# Patient Record
Sex: FEMALE | Race: WHITE | Employment: UNEMPLOYED | URBAN - METROPOLITAN AREA
[De-identification: names, ages, dates, MRNs, and addresses within clinical notes are randomized per-mention and may not be internally consistent; named-entity substitution may affect disease eponyms.]

---

## 2020-04-15 ENCOUNTER — NURSE TRIAGE (OUTPATIENT)
Dept: INTERNAL MEDICINE CLINIC | Facility: CLINIC | Age: 30
End: 2020-04-15

## 2020-04-15 NOTE — TELEPHONE ENCOUNTER
Action Requested: Summary for Provider     []  Critical Lab, Recommendations Needed  [] Need Additional Advice  []   FYI    []   Need Orders  [] Need Medications Sent to Pharmacy  []  Other     SUMMARY: Pt not seen since 2015; reports wants to re-establish travel out of state in past 30-days or contact with confirmed COVID-19 patient. Verified allergies and pharmacy on file.          Reason for Disposition  • Nursing judgment    Protocols used: NO PROTOCOL AVAILABLE - SICK ADULT-A-[place]

## 2020-04-15 NOTE — TELEPHONE ENCOUNTER
Patient like new patient I do notdo Video  visits on new patients, she needs to be seen in person, if she feels it is urgent can see any available provider this week in the office, I can see her on Monday versus my day to see patients in the office

## 2020-04-20 ENCOUNTER — OFFICE VISIT (OUTPATIENT)
Dept: INTERNAL MEDICINE CLINIC | Facility: CLINIC | Age: 30
End: 2020-04-20
Payer: MEDICAID

## 2020-04-20 ENCOUNTER — LAB ENCOUNTER (OUTPATIENT)
Dept: LAB | Age: 30
End: 2020-04-20
Attending: INTERNAL MEDICINE
Payer: MEDICAID

## 2020-04-20 VITALS
HEART RATE: 90 BPM | BODY MASS INDEX: 22 KG/M2 | DIASTOLIC BLOOD PRESSURE: 57 MMHG | RESPIRATION RATE: 22 BRPM | WEIGHT: 122 LBS | SYSTOLIC BLOOD PRESSURE: 91 MMHG

## 2020-04-20 DIAGNOSIS — R20.0 NUMBNESS AND TINGLING IN RIGHT HAND: ICD-10-CM

## 2020-04-20 DIAGNOSIS — R19.7 DIARRHEA, UNSPECIFIED TYPE: ICD-10-CM

## 2020-04-20 DIAGNOSIS — F33.1 MODERATE EPISODE OF RECURRENT MAJOR DEPRESSIVE DISORDER (HCC): ICD-10-CM

## 2020-04-20 DIAGNOSIS — R20.2 NUMBNESS AND TINGLING IN RIGHT HAND: ICD-10-CM

## 2020-04-20 DIAGNOSIS — R19.7 DIARRHEA, UNSPECIFIED TYPE: Primary | ICD-10-CM

## 2020-04-20 DIAGNOSIS — R51.9 HEADACHE DISORDER: ICD-10-CM

## 2020-04-20 DIAGNOSIS — G25.2 COARSE TREMORS: ICD-10-CM

## 2020-04-20 DIAGNOSIS — M79.89 MASS OF SOFT TISSUE: ICD-10-CM

## 2020-04-20 PROCEDURE — 80053 COMPREHEN METABOLIC PANEL: CPT

## 2020-04-20 PROCEDURE — 96127 BRIEF EMOTIONAL/BEHAV ASSMT: CPT | Performed by: INTERNAL MEDICINE

## 2020-04-20 PROCEDURE — 84443 ASSAY THYROID STIM HORMONE: CPT

## 2020-04-20 PROCEDURE — 99204 OFFICE O/P NEW MOD 45 MIN: CPT | Performed by: INTERNAL MEDICINE

## 2020-04-20 PROCEDURE — 36415 COLL VENOUS BLD VENIPUNCTURE: CPT

## 2020-04-20 PROCEDURE — 85025 COMPLETE CBC W/AUTO DIFF WBC: CPT

## 2020-04-20 PROCEDURE — 86140 C-REACTIVE PROTEIN: CPT

## 2020-04-20 PROCEDURE — 85652 RBC SED RATE AUTOMATED: CPT

## 2020-04-20 RX ORDER — MIRTAZAPINE 15 MG/1
15 TABLET, FILM COATED ORAL NIGHTLY
Qty: 30 TABLET | Refills: 1 | Status: SHIPPED | OUTPATIENT
Start: 2020-04-20 | End: 2020-06-10

## 2020-04-20 RX ORDER — NICOTINE POLACRILEX 4 MG/1
GUM, CHEWING ORAL
Qty: 30 TABLET | Refills: 0 | Status: SHIPPED | OUTPATIENT
Start: 2020-04-20 | End: 2020-07-27

## 2020-04-21 NOTE — PROGRESS NOTES
HPI:    Patient ID: Michaela Benoit is a 34year old female.   Presents for evaluation of the headache, numbness, diarrhea    HPI   Patient reports that in November she hit her head against sink at home, felt lightheaded nauseous, but did not pass out, had and lethargy  Cardiovascular:  Negative for chest pain and irregular heartbeat/palpitations  Respiratory:  Negative for cough, dyspnea and wheezing.   Eyes:  Negative for eye discharge and vision loss  Endocrine:  Negative for polydipsia and polyphagia  Int No cranial nerve deficit. She displays a negative Romberg sign. Gait normal.   Reflex Scores:       Bicep reflexes are 2+ on the right side and 2+ on the left side. Brachioradialis reflexes are 2+ on the right side and 3+ on the left side.        Disha Acuna

## 2020-04-30 ENCOUNTER — HOSPITAL ENCOUNTER (OUTPATIENT)
Dept: MRI IMAGING | Facility: HOSPITAL | Age: 30
Discharge: HOME | End: 2020-04-30
Attending: INTERNAL MEDICINE
Payer: MEDICAID

## 2020-04-30 DIAGNOSIS — R20.2 NUMBNESS AND TINGLING IN RIGHT HAND: ICD-10-CM

## 2020-04-30 DIAGNOSIS — G25.2 COARSE TREMORS: ICD-10-CM

## 2020-04-30 DIAGNOSIS — R51.9 HEADACHE DISORDER: ICD-10-CM

## 2020-04-30 DIAGNOSIS — R20.0 NUMBNESS AND TINGLING IN RIGHT HAND: ICD-10-CM

## 2020-04-30 DIAGNOSIS — M79.89 MASS OF SOFT TISSUE: ICD-10-CM

## 2020-04-30 PROCEDURE — 72195 MRI PELVIS W/O DYE: CPT | Performed by: INTERNAL MEDICINE

## 2020-04-30 PROCEDURE — 70553 MRI BRAIN STEM W/O & W/DYE: CPT | Performed by: INTERNAL MEDICINE

## 2020-04-30 PROCEDURE — 72141 MRI NECK SPINE W/O DYE: CPT | Performed by: INTERNAL MEDICINE

## 2020-04-30 PROCEDURE — A9575 INJ GADOTERATE MEGLUMI 0.1ML: HCPCS | Performed by: INTERNAL MEDICINE

## 2020-05-01 ENCOUNTER — TELEPHONE (OUTPATIENT)
Dept: INTERNAL MEDICINE CLINIC | Facility: CLINIC | Age: 30
End: 2020-05-01

## 2020-05-01 NOTE — TELEPHONE ENCOUNTER
Spoke to patient, reviewed her symptoms, episodes of syncope first time in her life, prior to episode she went for for a walk, did have strange bowel movement, but felt fine, and later on while watching TV and eating she did have a glass of wine she passed

## 2020-05-01 NOTE — TELEPHONE ENCOUNTER
Dr. Milagros Whitman, please see note below:    Patient called the on call last night and is calling back asking if Dr. Gui Matos advised Dr. Milagros Whitman of what happened to her last night.   Per patient she briefly passed out while she was in a chair eating an suddenly cam

## 2020-05-01 NOTE — TELEPHONE ENCOUNTER
On-call–patient called at night stating that she was feeling faint after vomiting a few hours before she had paged at midday  At that time she is feeling okay  Did advise patient to monitor symptoms and if this happens again then to go to ER or if she is h

## 2020-05-01 NOTE — TELEPHONE ENCOUNTER
Paging    Message # [phone] 2020 12:19a [MARICELAD]  To:  From: NORMAN Marcum MD:  Phone#:  ----------------------------------------------------------------------  Mahogany Shah [phone]  89 RE FAINTED AND VOMITING  Paged  at number : PAGE:

## 2020-05-05 ENCOUNTER — TELEMEDICINE (OUTPATIENT)
Dept: NEUROLOGY | Facility: CLINIC | Age: 30
End: 2020-05-05
Payer: MEDICAID

## 2020-05-05 DIAGNOSIS — R29.818 TRANSIENT NEUROLOGICAL SYMPTOMS: ICD-10-CM

## 2020-05-05 DIAGNOSIS — R55 SYNCOPE, UNSPECIFIED SYNCOPE TYPE: Primary | ICD-10-CM

## 2020-05-05 DIAGNOSIS — R53.1 RIGHT SIDED WEAKNESS: ICD-10-CM

## 2020-05-05 PROCEDURE — 99204 OFFICE O/P NEW MOD 45 MIN: CPT | Performed by: OTHER

## 2020-05-05 NOTE — PROGRESS NOTES
I conducted a telehealth visit with Dony Donahue today, 05/05/20, which was completed using two-way, real-time interactive audio and video communication.  This has been done in good shelyl to provide continuity of care in the best interest of the prov to moderate in severity. However she also had a syncopal event in the beginning of 2020 when she was sitting on a couch by herself and she is not sure if there was any convulsive activity with it or not.   She might have had few other episodes of passing o normal glutition  Neck- No masses or adenopathy  Cv: No cyanosis or edema     Neurological:     Mental Status- Alert and oriented x3.   Normal attention span and concentration  Thought process intact  Memory intact- recent and remote  Mood intact  Fund of k migraines, versus orthostatic syncope, versus vasovagal syncope versus syncope related to her marijuana use.   However it still could be concerning for possibility of seizures and therefore EEG will be done but also echocardiogram and cardiac monitor will b

## 2020-05-07 ENCOUNTER — PATIENT MESSAGE (OUTPATIENT)
Dept: NEUROLOGY | Facility: CLINIC | Age: 30
End: 2020-05-07

## 2020-05-07 NOTE — TELEPHONE ENCOUNTER
From: Brain Chan  To: Cassidy Friend MD  Sent: 5/7/2020 2:57 PM CDT  Subject: Visit Corinne Lacy,    I scheduled the tests for this coming week. I have the spinal tap tomorrow.  I do have one question about the spinal ta

## 2020-05-08 ENCOUNTER — HOSPITAL ENCOUNTER (OUTPATIENT)
Dept: GENERAL RADIOLOGY | Facility: HOSPITAL | Age: 30
Discharge: HOME | End: 2020-05-08
Attending: Other
Payer: MEDICAID

## 2020-05-08 VITALS — SYSTOLIC BLOOD PRESSURE: 106 MMHG | DIASTOLIC BLOOD PRESSURE: 61 MMHG | HEART RATE: 64 BPM | RESPIRATION RATE: 16 BRPM

## 2020-05-08 DIAGNOSIS — R29.818 TRANSIENT NEUROLOGICAL SYMPTOMS: ICD-10-CM

## 2020-05-08 DIAGNOSIS — R55 SYNCOPE, UNSPECIFIED SYNCOPE TYPE: ICD-10-CM

## 2020-05-08 PROCEDURE — 82945 GLUCOSE OTHER FLUID: CPT

## 2020-05-08 PROCEDURE — 82784 ASSAY IGA/IGD/IGG/IGM EACH: CPT

## 2020-05-08 PROCEDURE — 62328 DX LMBR SPI PNXR W/FLUOR/CT: CPT | Performed by: OTHER

## 2020-05-08 PROCEDURE — 36415 COLL VENOUS BLD VENIPUNCTURE: CPT

## 2020-05-08 PROCEDURE — 84157 ASSAY OF PROTEIN OTHER: CPT

## 2020-05-08 PROCEDURE — 89050 BODY FLUID CELL COUNT: CPT

## 2020-05-08 PROCEDURE — 84165 PROTEIN E-PHORESIS SERUM: CPT

## 2020-05-08 PROCEDURE — 82040 ASSAY OF SERUM ALBUMIN: CPT

## 2020-05-08 PROCEDURE — 83916 OLIGOCLONAL BANDS: CPT

## 2020-05-08 PROCEDURE — 89051 BODY FLUID CELL COUNT: CPT

## 2020-05-08 PROCEDURE — 82042 OTHER SOURCE ALBUMIN QUAN EA: CPT

## 2020-05-08 RX ORDER — LIDOCAINE HYDROCHLORIDE 10 MG/ML
INJECTION, SOLUTION EPIDURAL; INFILTRATION; INTRACAUDAL; PERINEURAL
Status: DISCONTINUED
Start: 2020-05-08 | End: 2020-05-08 | Stop reason: WASHOUT

## 2020-05-08 NOTE — TELEPHONE ENCOUNTER
I had a long discussion with the patient about the merits of lumbar puncture.   She then decided to proceed with it

## 2020-05-08 NOTE — IMAGING NOTE
PT D/C VIA WC , VSS NO C/O. AVS GIVEN W/LUMBAR PUNCTURE INSTRUCTIONS, READ/REVIEWED, Q&A DONE.  PT COMFORTABLE W/DC

## 2020-05-09 ENCOUNTER — HOSPITAL ENCOUNTER (OUTPATIENT)
Dept: CV DIAGNOSTICS | Facility: HOSPITAL | Age: 30
Discharge: HOME | End: 2020-05-09
Attending: Other
Payer: MEDICAID

## 2020-05-09 DIAGNOSIS — R55 SYNCOPE, UNSPECIFIED SYNCOPE TYPE: ICD-10-CM

## 2020-05-09 PROCEDURE — 93306 TTE W/DOPPLER COMPLETE: CPT | Performed by: OTHER

## 2020-05-11 ENCOUNTER — TELEPHONE (OUTPATIENT)
Dept: NEUROLOGY | Facility: CLINIC | Age: 30
End: 2020-05-11

## 2020-05-11 NOTE — TELEPHONE ENCOUNTER
----- Message from Cassidy Friend MD sent at 5/11/2020 10:25 AM CDT -----  Please let the patient know that results of these particular lab tests so far were normal.  However we are still waiting for the additional test for oligoclonal bands    Thank

## 2020-05-11 NOTE — TELEPHONE ENCOUNTER
----- Message from Macy Stevenson MD sent at 5/11/2020 10:24 AM CDT -----  Please let the patient know that echocardiogram didn't show signs of the a. Fib or any holes that could have contributed to the TIA/stroke symptoms.

## 2020-05-11 NOTE — TELEPHONE ENCOUNTER
Spoke to patient and notified her of below. She was understanding. She had LP done on Friday. She states that she had slight headache Saturday but yesterday she had migraine which has lingered on to today. She states that it is worst than Saturday.  She

## 2020-05-12 ENCOUNTER — TELEPHONE (OUTPATIENT)
Dept: PHYSICAL THERAPY | Facility: HOSPITAL | Age: 30
End: 2020-05-12

## 2020-05-12 ENCOUNTER — APPOINTMENT (OUTPATIENT)
Dept: PHYSICAL THERAPY | Facility: HOSPITAL | Age: 30
End: 2020-05-12
Attending: Other
Payer: MEDICAID

## 2020-05-12 ENCOUNTER — TELEPHONE (OUTPATIENT)
Dept: NEUROLOGY | Facility: CLINIC | Age: 30
End: 2020-05-12

## 2020-05-12 NOTE — TELEPHONE ENCOUNTER
S/w pt who verbalized understanding of normal test results per Dr. Meier Crimes in previous note and knows to repeat MRI in 1 year. Pt states headaches were better today. She has been up and moving around and is tolerating it well.  Pt will try caffeine pills

## 2020-05-12 NOTE — TELEPHONE ENCOUNTER
LMTCB - regard headache tx - continued on 5/12/20 telephone encounter when calling pt w/ additional results.

## 2020-05-12 NOTE — TELEPHONE ENCOUNTER
LMTCB regarding results/headache tx recommendations in 5/11/20 telephone encounter (5 cups of coffee/caffeine pills) per Dr. Rosalie Peterson.

## 2020-05-12 NOTE — TELEPHONE ENCOUNTER
----- Message from Yaz Haque MD sent at 5/12/2020  7:31 AM CDT -----  Please let the patient know that results of these particular lab tests so far were normal.  No oligoclonal bands, which makes the diagnosis of multiple sclerosis less likely.

## 2020-05-14 ENCOUNTER — OFFICE VISIT (OUTPATIENT)
Dept: PHYSICAL THERAPY | Facility: HOSPITAL | Age: 30
End: 2020-05-14
Attending: Other
Payer: MEDICAID

## 2020-05-14 DIAGNOSIS — R53.1 RIGHT SIDED WEAKNESS: ICD-10-CM

## 2020-05-14 PROCEDURE — 97161 PT EVAL LOW COMPLEX 20 MIN: CPT

## 2020-05-14 PROCEDURE — 97112 NEUROMUSCULAR REEDUCATION: CPT

## 2020-05-14 NOTE — PATIENT INSTRUCTIONS
[facility]    1. Stand heel-toe for 30 seconds eyes open, then 30 sec eyes closed, with one foot forward, then switch and do the other foot. 2.  Stand on one leg, 30 sec on each. If you can, try to do eyes closed.     3. Stand with 2 cups or

## 2020-05-14 NOTE — PROGRESS NOTES
NEUROLOGICAL PHYSICAL THERAPY EVALUATION:   Referring Physician: Dr. Dolores Coker  Diagnosis: right sided weakness, imbalance      Date of Onset: January 2020 Date of Service: 5/14/2020  Insurance:  Medicaid     PATIENT SUMMARY   Maria C Fanny is a 34 year impaired coordination right UE. The results of the objective tests and measures show decreased postural control for standing/functional tasks, dysmetria with placing/coordination tasks with right LE.   Functional deficits include but are not limited to imba plan, expectations, and prognosis. Pt was also provided recommendations for postural corrections and importance of remaining active. Patient was instructed in and issued a HEP for: balance and coordination tasks.     PLAN for next session:  [place], 4 square

## 2020-05-18 ENCOUNTER — OFFICE VISIT (OUTPATIENT)
Dept: OCCUPATIONAL MEDICINE | Facility: HOSPITAL | Age: 30
End: 2020-05-18
Attending: Other
Payer: MEDICAID

## 2020-05-18 ENCOUNTER — HOSPITAL ENCOUNTER (OUTPATIENT)
Dept: CV DIAGNOSTICS | Facility: HOSPITAL | Age: 30
Discharge: HOME | End: 2020-05-18
Attending: Other
Payer: MEDICAID

## 2020-05-18 DIAGNOSIS — R53.1 RIGHT SIDED WEAKNESS: ICD-10-CM

## 2020-05-18 DIAGNOSIS — R55 SYNCOPE, UNSPECIFIED SYNCOPE TYPE: ICD-10-CM

## 2020-05-18 PROCEDURE — 93270 REMOTE 30 DAY ECG REV/REPORT: CPT | Performed by: OTHER

## 2020-05-18 PROCEDURE — 93271 ECG/MONITORING AND ANALYSIS: CPT | Performed by: OTHER

## 2020-05-18 PROCEDURE — 97166 OT EVAL MOD COMPLEX 45 MIN: CPT | Performed by: OCCUPATIONAL THERAPIST

## 2020-05-18 PROCEDURE — 93272 ECG/REVIEW INTERPRET ONLY: CPT | Performed by: OTHER

## 2020-05-18 PROCEDURE — 97110 THERAPEUTIC EXERCISES: CPT | Performed by: OCCUPATIONAL THERAPIST

## 2020-05-18 NOTE — PROGRESS NOTES
OCCUPATIONAL THERAPY UPPER EXTREMITY EVALUATION:   Referring Physician: Dr. Melanie Pascual  Date of onset:2010  Diagnosis: right sided weakness Date of Service: 05/18/20       PATIENT SUMMARY:   Aundra Holter is a 33 y/o female who presents to therapy today wi Deepa. Significant findings include: Anxiety, Depression        ASSESSMENT:   Roney Richmond is a pleasant 34 yr old female who came to therapy complaining of increased weakness in right hand as well as  poor hand coordination.  She works as a  and l hand   Rapid Alternating Movements: Performed slower in right hand   9 Hole Peg Test:  Right 52 secs, Left  20 secs      ROM: WNL CHUCK UE except right  IF and MF  PIP are hyperextended.     MANUAL MUSCLE TESTING:    STRENGTH/MMT:   Shoulder Elbow Wrist   Fle Charges: OT Eval x1, TE    Total Timed Treatment: 12 min     Total Treatment Time: 45 min       PLAN OF CARE:   Goals:      Pt will be independent and compliant with comprehensive HEP to maintain progress

## 2020-05-20 ENCOUNTER — TELEPHONE (OUTPATIENT)
Dept: PHYSICAL THERAPY | Facility: HOSPITAL | Age: 30
End: 2020-05-20

## 2020-05-20 ENCOUNTER — APPOINTMENT (OUTPATIENT)
Dept: OCCUPATIONAL MEDICINE | Facility: HOSPITAL | Age: 30
End: 2020-05-20
Attending: Other
Payer: MEDICAID

## 2020-05-20 ENCOUNTER — TELEPHONE (OUTPATIENT)
Dept: OCCUPATIONAL MEDICINE | Facility: HOSPITAL | Age: 30
End: 2020-05-20

## 2020-05-21 ENCOUNTER — APPOINTMENT (OUTPATIENT)
Dept: PHYSICAL THERAPY | Facility: HOSPITAL | Age: 30
End: 2020-05-21
Attending: Other
Payer: MEDICAID

## 2020-05-27 ENCOUNTER — OFFICE VISIT (OUTPATIENT)
Dept: OCCUPATIONAL MEDICINE | Facility: HOSPITAL | Age: 30
End: 2020-05-27
Attending: Other
Payer: MEDICAID

## 2020-05-27 PROCEDURE — 97110 THERAPEUTIC EXERCISES: CPT | Performed by: OCCUPATIONAL THERAPIST

## 2020-05-27 PROCEDURE — 97140 MANUAL THERAPY 1/> REGIONS: CPT | Performed by: OCCUPATIONAL THERAPIST

## 2020-05-27 NOTE — PROGRESS NOTES
Dx: Right side weakness       Authorized # of Visits:  8       Next MD visit: none scheduled  Fall Risk: standard         Precautions: n/a           Medication Changes since last visit?: No  Subjective: Patient denies pain in right digits.     Objective: OT.  Patient will demonstrate increase in right two point pinch to 7 lbs and three point pinch kris 12 lbs for ease in picking up glasses at work.   Patient will demonstrate improved hand dexterity as evident by decrease in right 9 hole peg test time by 10 se

## 2020-06-01 ENCOUNTER — OFFICE VISIT (OUTPATIENT)
Dept: OCCUPATIONAL MEDICINE | Facility: HOSPITAL | Age: 30
End: 2020-06-01
Attending: Other
Payer: MEDICAID

## 2020-06-01 PROCEDURE — 97110 THERAPEUTIC EXERCISES: CPT | Performed by: OCCUPATIONAL THERAPIST

## 2020-06-01 PROCEDURE — 97140 MANUAL THERAPY 1/> REGIONS: CPT | Performed by: OCCUPATIONAL THERAPIST

## 2020-06-01 NOTE — PROGRESS NOTES
Dx: Right side weakness       Authorized # of Visits:  8       Next MD visit: none scheduled  Fall Risk: standard         Precautions: n/a           Medication Changes since last visit?: No  Subjective: \"I found the silver rings on [employer] and I think I wi helpful in preventing hyperextension in IP, fabricate for MF as well. Goals:     Pt will be independent and compliant with comprehensive HEP to maintain progress achieved in OT.   Patient will demonstrate increase in right two point pinch to 7 lbs and th

## 2020-06-03 ENCOUNTER — OFFICE VISIT (OUTPATIENT)
Dept: OCCUPATIONAL MEDICINE | Facility: HOSPITAL | Age: 30
End: 2020-06-03
Attending: Other
Payer: MEDICAID

## 2020-06-03 PROCEDURE — 97140 MANUAL THERAPY 1/> REGIONS: CPT | Performed by: OCCUPATIONAL THERAPIST

## 2020-06-03 PROCEDURE — 97110 THERAPEUTIC EXERCISES: CPT | Performed by: OCCUPATIONAL THERAPIST

## 2020-06-03 NOTE — PROGRESS NOTES
Dx: Right side weakness       Authorized # of Visits:  8       Next MD visit: none scheduled  Fall Risk: standard         Precautions: n/a           Medication Changes since last visit?: No  Subjective:  \"The splint you made for my middle finger is too t HEP instruction                       hand exercised and dexterity worksheet  Putty HEP exercises, two point pinch, extension,                Therapeutic Activity                                       Neuromuscular Re-education

## 2020-06-04 ENCOUNTER — OFFICE VISIT (OUTPATIENT)
Dept: PHYSICAL THERAPY | Facility: HOSPITAL | Age: 30
End: 2020-06-04
Attending: Other
Payer: MEDICAID

## 2020-06-04 PROCEDURE — 97112 NEUROMUSCULAR REEDUCATION: CPT

## 2020-06-04 NOTE — PATIENT INSTRUCTIONS
[facility]    1. Stand heel-toe for 30 seconds eyes open, then 30 sec eyes closed, with one foot forward, then switch and do the other foot. 2.  Stand on one leg, 30 sec on each. Turn your head to the right, center, and left.   Only turn as f

## 2020-06-04 NOTE — PROGRESS NOTES
Date  6/4/20          Visit Number  2/6          [facility] x          Ther EX           Ther Act           Gait Training           CRM            Manual             Dx: R Sided weakness, imbalance         Insurance:  Medicaid    Visit # authorized:  [address] [facility], [address] [facility]

## 2020-06-08 ENCOUNTER — OFFICE VISIT (OUTPATIENT)
Dept: OCCUPATIONAL MEDICINE | Facility: HOSPITAL | Age: 30
End: 2020-06-08
Attending: Other
Payer: MEDICAID

## 2020-06-08 PROCEDURE — 97110 THERAPEUTIC EXERCISES: CPT | Performed by: OCCUPATIONAL THERAPIST

## 2020-06-08 NOTE — PROGRESS NOTES
Dx: Right side weakness       Authorized # of Visits:  8       Next MD visit: none scheduled  Fall Risk: standard         Precautions: n/a           Medication Changes since last visit?: No  Subjective: \"Both splints are feeling fine now. \"    Objective marbles on each large peg , alternate digits and thumb x 30  Fine motor task, place marbles on each large peg, pinch exercise removemarbles with large tweezer          Red web wrist flex/ext stretch    10 sec x 5,   Blue web       [employer]  x 20

## 2020-06-09 ENCOUNTER — OFFICE VISIT (OUTPATIENT)
Dept: PHYSICAL THERAPY | Facility: HOSPITAL | Age: 30
End: 2020-06-09
Attending: Other
Payer: MEDICAID

## 2020-06-09 PROCEDURE — 97112 NEUROMUSCULAR REEDUCATION: CPT

## 2020-06-09 NOTE — PATIENT INSTRUCTIONS
[facility]    1. Stand heel-toe for 30 seconds eyes open, then 30 sec eyes closed, with one foot forward, then switch and do the other foot. If you are having a hard time, move the front foot out just a bit.     2.  Stand on one leg, 30 sec on ea

## 2020-06-09 NOTE — PROGRESS NOTES
Date  6/4/20 6/9/20         Visit Number  2/6 3/6         NMR x x         Ther EX           Ther Act           Gait Training           CRM            Manual             Dx: R Sided weakness, imbalance         Insurance:  Medicaid    Visit # authorized:  15

## 2020-06-10 ENCOUNTER — TELEPHONE (OUTPATIENT)
Dept: INTERNAL MEDICINE CLINIC | Facility: CLINIC | Age: 30
End: 2020-06-10

## 2020-06-10 ENCOUNTER — OFFICE VISIT (OUTPATIENT)
Dept: OCCUPATIONAL MEDICINE | Facility: HOSPITAL | Age: 30
End: 2020-06-10
Attending: Other
Payer: MEDICAID

## 2020-06-10 PROCEDURE — 97110 THERAPEUTIC EXERCISES: CPT | Performed by: OCCUPATIONAL THERAPIST

## 2020-06-10 NOTE — PROGRESS NOTES
Dx: Right side weakness       Authorized # of Visits:  8       Next MD visit: none scheduled  Fall Risk: standard         Precautions: n/a           Medication Changes since last visit?: No  Subjective: \"I'm needing some adjustments on my middle finger extension rings 5min        isolated digit extension exercises  x10  PRE digit extension with rubber band x 10 each  PRE digit extension with rubber band x 10 each  Fine motor task, place marbles on each large peg , alternate digits and thumb x 30  Fine mo alexsander.      Plan: Continue with POC      Charges: TE3  Total Timed Treatment: 40 min  Total Treatment Time: 40 min

## 2020-06-10 NOTE — TELEPHONE ENCOUNTER
Spoke to patient, she discontinued mirtazapine, was causing oversedation, she slept well throughout the day, still planning to see psychiatrist or counselor. Referred patient to see gastroenterology for evaluation of loose bowel movements.

## 2020-06-10 NOTE — TELEPHONE ENCOUNTER
Please advise: The patient stated since September of 2019, she continues to have 1 or 2 loose or watery stools light brown/yellow stools a day. The abdominal pain is gone. She did try the Omeprazole and it did not work. She took for one month.   She

## 2020-06-15 ENCOUNTER — OFFICE VISIT (OUTPATIENT)
Dept: OCCUPATIONAL MEDICINE | Facility: HOSPITAL | Age: 30
End: 2020-06-15
Attending: Other
Payer: MEDICAID

## 2020-06-15 PROCEDURE — 97110 THERAPEUTIC EXERCISES: CPT | Performed by: OCCUPATIONAL THERAPIST

## 2020-06-15 NOTE — PROGRESS NOTES
Dx: Right side weakness       Authorized # of Visits:  8       Next MD visit: none scheduled  Fall Risk: standard         Precautions: n/a           Medication Changes since last visit?: No  Subjective: \"I'm more shaky today in my right hand, I don't kn hand dexterity activity- rotate sponge cube  x 5  Purple putty, two point pinch, extension,  7 min  Soft putty, patient's own abd, two point pinch, extension, 5 pegs  Soft putty, patient's own abd, two point pinch, extension, 5 pegs  Soft putty, patient's Goals:     Pt will be independent and compliant with comprehensive HEP to maintain progress achieved in OT. Patient will demonstrate increase in right two point pinch to 7 lbs and three point pinch kris 12 lbs for ease in picking up glasses at work. ...(ma

## 2020-06-16 ENCOUNTER — OFFICE VISIT (OUTPATIENT)
Dept: PHYSICAL THERAPY | Facility: HOSPITAL | Age: 30
End: 2020-06-16
Attending: Other
Payer: MEDICAID

## 2020-06-16 PROCEDURE — 97112 NEUROMUSCULAR REEDUCATION: CPT

## 2020-06-16 NOTE — PROGRESS NOTES
Date  6/4/20 6/9/20 6/16/20        Visit Number  2/6 3/6 4/6        NMR x x x        Ther EX           Ther Act           Gait Training           CRM            Manual             Dx: R Sided weakness, imbalance         Insurance:  Medicaid    Visit # Weyman Hint

## 2020-06-18 ENCOUNTER — TELEPHONE (OUTPATIENT)
Dept: OCCUPATIONAL MEDICINE | Facility: HOSPITAL | Age: 30
End: 2020-06-18

## 2020-06-22 ENCOUNTER — OFFICE VISIT (OUTPATIENT)
Dept: OCCUPATIONAL MEDICINE | Facility: HOSPITAL | Age: 30
End: 2020-06-22
Attending: Other
Payer: MEDICAID

## 2020-06-22 PROCEDURE — 97110 THERAPEUTIC EXERCISES: CPT | Performed by: OCCUPATIONAL THERAPIST

## 2020-06-22 NOTE — PROGRESS NOTES
Dx: Right side weakness       Authorized # of Visits:  8       Next MD visit: none scheduled  Fall Risk: standard         Precautions: n/a           Medication Changes since last visit?: No  Subjective: \"I'm used to doing everything with my left hand, i Soft putty, patient's own abd, two point pinch, extension, 5 pegs  Soft putty, patient's own abd, two point pinch,,and extension rings 5min  Soft putty, patient's own abd, two point pinch,,and extension rings 5min  Fine motor , placing marbles on 30 large and coordination. Reviewed HEP and using figure eight orthosis to prevent PIP hyperextension.     Goals: See Discharge Note below for details    Plan: See Discharge Note below for details    Charges: TE3  Total Timed Treatment: 40 min  Total Treatment Time: 5/5 5/5 5/5 5/5 5/5      Special Test: 9 hole peg test   Right 64 secs, left 22 secs      Goals:   Pt will be independent and compliant with comprehensive HEP to maintain progress achieved in OT. Roberto Muñiz .(Achieved)  Patient will demonstrat

## 2020-06-30 ENCOUNTER — TELEPHONE (OUTPATIENT)
Dept: NEUROLOGY | Facility: CLINIC | Age: 30
End: 2020-06-30

## 2020-06-30 ENCOUNTER — OFFICE VISIT (OUTPATIENT)
Dept: PHYSICAL THERAPY | Facility: HOSPITAL | Age: 30
End: 2020-06-30
Attending: Other
Payer: MEDICAID

## 2020-06-30 PROCEDURE — 97112 NEUROMUSCULAR REEDUCATION: CPT

## 2020-06-30 NOTE — TELEPHONE ENCOUNTER
----- Message from Yogi Leach MD sent at 6/30/2020  7:38 AM CDT -----  Please let the patient know that cardiac monitor didn't show signs of the any heart arrhythmia to explain her symptoms.     Have her follow-up in the clinic for better in perso

## 2020-06-30 NOTE — PROGRESS NOTES
Patient Name: Nakia Abdi, : 1990, MRN: [id number]   Date:  2020  Referring Physician:  Niesha Haq    Diagnosis: Right sided weakness, imbalance    Discharge Summary  Pt has attended 5 visits in physical therapy.     Progress No your referral. If you have any questions, please contact me at Dept: [phone].     Sincerely,  Zechariah Carlos PT, NCS    Electronically signed by therapist:  Zechariah Carlos PT, NCS

## 2020-06-30 NOTE — PROGRESS NOTES
Please let the patient know that cardiac monitor didn't show signs of the any heart arrhythmia to explain her symptoms. Have her follow-up in the clinic for better in person examination  .

## 2020-06-30 NOTE — TELEPHONE ENCOUNTER
Called pt w/ results of cardiac monitor per Dr. Cassidy Cabezas in previous note. Pt states she is still getting muscle twitches, has been happening since getting MRI which is when she lost consciousness while getting MRI.  Pt states she has done research which s

## 2020-07-06 ENCOUNTER — OFFICE VISIT (OUTPATIENT)
Dept: NEUROLOGY | Facility: CLINIC | Age: 30
End: 2020-07-06
Payer: MEDICAID

## 2020-07-06 VITALS
HEIGHT: 64 IN | HEART RATE: 68 BPM | SYSTOLIC BLOOD PRESSURE: 98 MMHG | DIASTOLIC BLOOD PRESSURE: 60 MMHG | WEIGHT: 127 LBS | BODY MASS INDEX: 21.68 KG/M2

## 2020-07-06 DIAGNOSIS — R55 SYNCOPE, UNSPECIFIED SYNCOPE TYPE: Primary | ICD-10-CM

## 2020-07-06 DIAGNOSIS — R21 RASH: ICD-10-CM

## 2020-07-06 DIAGNOSIS — R29.818 TRANSIENT NEUROLOGICAL SYMPTOMS: ICD-10-CM

## 2020-07-06 DIAGNOSIS — R53.1 RIGHT SIDED WEAKNESS: ICD-10-CM

## 2020-07-06 PROCEDURE — 99214 OFFICE O/P EST MOD 30 MIN: CPT | Performed by: OTHER

## 2020-07-06 NOTE — PROGRESS NOTES
Neurology follow-up visit     Referred By: Dr. Frias ref. provider found    Chief Complaint: Patient presents with: Other: LOV 5/5/2020. Pt presents with muscle twitching bilaterl arms and neck. Alcohol increases symptoms as does sleeping.   No weakness twitching after being exposed to gadolinium because she was reading online about gadolinium causing problems. Past Medical History:   Diagnosis Date   • Acne    • Anxiety    • Depression        No past surgical history on file.     Social history:    S symmetric, no facial weakness  VIII. Hearing intact to whisper, tuning fork or finger rub. IX. Pallet elevates symmetrically. XI. Shoulder shrug is intact  XII.  Tongue is midline    Motor Exam:  Muscle tone normal  No atrophy or fasciculations  Strength- potentially be concerning for multiple sclerosis, lumbar puncture was not revealing. Therefore will obtain opinion from the [place] [id number] specialist.  Patient was also reassured about safety of gadolinium use           Education and counseling provided to patient.  I

## 2020-07-10 ENCOUNTER — HOSPITAL ENCOUNTER (OUTPATIENT)
Dept: ELECTROPHYSIOLOGY | Facility: HOSPITAL | Age: 30
Discharge: HOME | End: 2020-07-10
Attending: Other
Payer: MEDICAID

## 2020-07-10 PROCEDURE — 95816 EEG AWAKE AND DROWSY: CPT | Performed by: OTHER

## 2020-07-10 NOTE — PROCEDURES
EEG report    REFERRING PHYSICIAN: Gaby Castillo MD    PCP and phone number:  Gaby Vale MD  [phone]    TECHNIQUE: 21 channels of EEG, 2 channels of EOG, and 1 channel of EKG were recorded utilizing the International 10/20 System.  The re

## 2020-07-13 ENCOUNTER — TELEPHONE (OUTPATIENT)
Dept: NEUROLOGY | Facility: CLINIC | Age: 30
End: 2020-07-13

## 2020-07-13 NOTE — TELEPHONE ENCOUNTER
Patient notified of the normal EEG results per Dr. Jose Elizabeth.  Patient verbalized understanding and was thankful

## 2020-07-13 NOTE — TELEPHONE ENCOUNTER
----- Message from Armenta Canavan, MD sent at 7/11/2020  7:59 AM CDT -----  Please let patient know that EEG was normal.

## 2020-07-27 ENCOUNTER — OFFICE VISIT (OUTPATIENT)
Dept: NEUROLOGY | Facility: CLINIC | Age: 30
End: 2020-07-27
Payer: MEDICAID

## 2020-07-27 VITALS
DIASTOLIC BLOOD PRESSURE: 77 MMHG | BODY MASS INDEX: 21.68 KG/M2 | SYSTOLIC BLOOD PRESSURE: 110 MMHG | TEMPERATURE: 97 F | HEART RATE: 78 BPM | RESPIRATION RATE: 16 BRPM | WEIGHT: 127 LBS | HEIGHT: 64 IN

## 2020-07-27 DIAGNOSIS — G37.9 DEMYELINATING DISEASE (HCC): Primary | ICD-10-CM

## 2020-07-27 PROCEDURE — 99214 OFFICE O/P EST MOD 30 MIN: CPT | Performed by: OTHER

## 2020-07-27 PROCEDURE — 3008F BODY MASS INDEX DOCD: CPT | Performed by: OTHER

## 2020-07-27 PROCEDURE — 3074F SYST BP LT 130 MM HG: CPT | Performed by: OTHER

## 2020-07-27 PROCEDURE — 3078F DIAST BP <80 MM HG: CPT | Performed by: OTHER

## 2020-07-27 NOTE — H&P
[address] [facility] with [facility]  7/27/2020    1:25 PM      30LHF with GI and headache issues    HPI: approximately around May or April she consulted PCP  She hit her head in January.   Bent forward to pick s murmur  Lungs are clear, no wheezing  Abd: soft  Extremities: no cyanosis or edema      Neurologic Exam:  Attention, reasoning, memory and comprehension: normal    Language and speech normal  CN: 2-12: normal  Motor:     Uppers: no drift , mild subtle slow

## 2020-07-30 ENCOUNTER — LAB ENCOUNTER (OUTPATIENT)
Dept: LAB | Age: 30
End: 2020-07-30
Attending: Other
Payer: MEDICAID

## 2020-07-30 DIAGNOSIS — G37.9 DEMYELINATING DISEASE (HCC): ICD-10-CM

## 2020-07-30 LAB — RHEUMATOID FACT SERPL-ACNC: <10 IU/ML (ref ?–15)

## 2020-07-30 PROCEDURE — 86225 DNA ANTIBODY NATIVE: CPT

## 2020-07-30 PROCEDURE — 86431 RHEUMATOID FACTOR QUANT: CPT

## 2020-07-30 PROCEDURE — 85610 PROTHROMBIN TIME: CPT

## 2020-07-30 PROCEDURE — 86147 CARDIOLIPIN ANTIBODY EA IG: CPT

## 2020-07-30 PROCEDURE — 86235 NUCLEAR ANTIGEN ANTIBODY: CPT

## 2020-07-30 PROCEDURE — 85390 FIBRINOLYSINS SCREEN I&R: CPT

## 2020-07-30 PROCEDURE — 85613 RUSSELL VIPER VENOM DILUTED: CPT

## 2020-07-30 PROCEDURE — 86038 ANTINUCLEAR ANTIBODIES: CPT

## 2020-07-30 PROCEDURE — 85730 THROMBOPLASTIN TIME PARTIAL: CPT

## 2020-07-30 PROCEDURE — 86039 ANTINUCLEAR ANTIBODIES (ANA): CPT

## 2020-07-30 PROCEDURE — 85598 HEXAGNAL PHOSPH PLTLT NEUTRL: CPT

## 2020-07-30 PROCEDURE — 86146 BETA-2 GLYCOPROTEIN ANTIBODY: CPT

## 2020-07-30 PROCEDURE — 36415 COLL VENOUS BLD VENIPUNCTURE: CPT

## 2020-07-31 LAB
APTT PPP: 28.1 SECONDS (ref 23.2–35.3)
BETA 2 GLYCOPROTEIN 1 AB, IGG: 1.9 U/ML (ref ?–7)
BETA 2 GLYCOPROTEIN 1 AB, IGM: <2.9 U/ML (ref ?–7)
CARDIOLIPIN IGG SERPL-ACNC: 3.9 GPL (ref ?–10)
CARDIOLIPIN IGM SERPL-ACNC: 1.6 MPL (ref ?–10)
CONFIRM APTT STACLOT: NEGATIVE
CONFIRM DRVVT: 0.8 S (ref 0–1.1)
PROTHROMBIN TIME: 13.4 SECONDS (ref 11.8–14.5)

## 2020-08-03 LAB — NUCLEAR IGG TITR SER IF: POSITIVE {TITER}

## 2020-08-04 LAB
DSDNA AB TITR SER: <10 {TITER}
ENA SM IGG SER QL: NEGATIVE
ENA SM+RNP AB SER QL: NEGATIVE
ENA SS-A AB SER QL IA: NEGATIVE
ENA SS-B AB SER QL IA: NEGATIVE

## 2020-08-05 ENCOUNTER — TELEPHONE (OUTPATIENT)
Dept: NEUROLOGY | Facility: CLINIC | Age: 30
End: 2020-08-05

## 2020-08-05 LAB — ANA NUCLEOLAR TITR SER IF: 160 {TITER}

## 2020-08-06 NOTE — TELEPHONE ENCOUNTER
RN contacted the patient and informed her that Dr. Lawanda Bryan sent her a My Chart message explaining the results. RN informed the patient to log into My Chart and she will see the results. Pt verbalized understanding and did not have any further questions.

## 2020-08-07 ENCOUNTER — TELEPHONE (OUTPATIENT)
Dept: NEUROLOGY | Facility: CLINIC | Age: 30
End: 2020-08-07

## 2020-08-07 NOTE — TELEPHONE ENCOUNTER
Patient reports she would like to have both studies performed at the same appointment. Advised to cancel today's appointment, as her insurance has not approved the MRA procedure. She is in agreement with this plan.

## 2020-08-07 NOTE — TELEPHONE ENCOUNTER
Pt stated via patient message (see previous note) that Dr. Bear Murry wants her to have a MRA in addition to the MRI. Pt has MRI appt today. Call pt asap today.

## 2020-08-17 ENCOUNTER — TELEPHONE (OUTPATIENT)
Dept: FAMILY MEDICINE CLINIC | Facility: CLINIC | Age: 30
End: 2020-08-17

## 2020-08-18 NOTE — TELEPHONE ENCOUNTER
Spoke with pt. Pt. states she need referral for therapist that deal with anxiety. Appt. Made for Friday 8-.

## 2020-08-18 NOTE — TELEPHONE ENCOUNTER
Per NK to Call Leesa Moon at [phone] ,    Spoke with pt. Pt. States that Leesa Moon does not accept her insurance.

## 2020-08-21 ENCOUNTER — TELEPHONE (OUTPATIENT)
Dept: INTERNAL MEDICINE CLINIC | Facility: CLINIC | Age: 30
End: 2020-08-21

## 2020-08-21 NOTE — TELEPHONE ENCOUNTER
Spoke with  [facility], states the interaction will be increase in serotonin toxicity. Dr Aarti Lechuga advise if you want both medications for the patient.     Please reply to chela: IVETTE Bui

## 2020-08-21 NOTE — TELEPHONE ENCOUNTER
Caitlyn Lagos from [facility] stated both medications interact with each other. Current Outpatient Medications   Medication Sig Dispense Refill   • traZODone HCl 50 MG Oral Tab Take 1 tablet (50 mg total) by mouth nightly.  90 tablet 0   • escitalopram 10 MG Ora

## 2020-08-21 NOTE — TELEPHONE ENCOUNTER
Please call pharmacy about medication I am aware of interactions patient should be fine we combine medications all the time

## 2020-08-22 NOTE — PROGRESS NOTES
HPI:    Patient ID: Yun Guzman is a 27year old female.   Presents for evaluation of anxiety  HPI  Patient has been struggling with depression anxiety for many years, lately she feels the depression seems not in the issue she is just anxious about lacie Left arm, Patient Position: Sitting, Cuff Size: adult)   Pulse 78   Resp 20   Wt 125 lb (56.7 kg)   LMP 07/23/2020   BMI 21.46 kg/m²    Physical Exam    Constitutional: She is oriented to person, place, and time.  She appears well-developed and well-nourish

## 2020-08-26 ENCOUNTER — TELEPHONE (OUTPATIENT)
Dept: NEUROLOGY | Facility: CLINIC | Age: 30
End: 2020-08-26

## 2020-08-26 DIAGNOSIS — I77.6 VASCULITIS, CENTRAL NERVOUS SYSTEM (HCC): Primary | ICD-10-CM

## 2020-09-01 ENCOUNTER — PATIENT MESSAGE (OUTPATIENT)
Dept: NEUROLOGY | Facility: CLINIC | Age: 30
End: 2020-09-01

## 2020-09-01 ENCOUNTER — HOSPITAL ENCOUNTER (OUTPATIENT)
Dept: MRI IMAGING | Age: 30
Discharge: HOME | End: 2020-09-01
Attending: Other
Payer: MEDICAID

## 2020-09-01 DIAGNOSIS — G37.9 DEMYELINATING DISEASE (HCC): ICD-10-CM

## 2020-09-01 DIAGNOSIS — I77.6 VASCULITIS, CENTRAL NERVOUS SYSTEM (HCC): ICD-10-CM

## 2020-09-01 PROCEDURE — 72146 MRI CHEST SPINE W/O DYE: CPT | Performed by: OTHER

## 2020-09-01 PROCEDURE — 70544 MR ANGIOGRAPHY HEAD W/O DYE: CPT | Performed by: OTHER

## 2020-09-24 ENCOUNTER — TELEPHONE (OUTPATIENT)
Dept: NEUROLOGY | Facility: CLINIC | Age: 30
End: 2020-09-24

## 2020-09-24 NOTE — TELEPHONE ENCOUNTER
S: Pt has left eye twitching. B: Dx: Transient Neurological Symptoms    A: New symptom lasts about 10 seconds and happens about every other day. Pt reports no other symptoms. R: RN will route to provider for recommendation.

## 2020-10-14 NOTE — TELEPHONE ENCOUNTER
Patient calling with new symptoms. Stated she has had problems with her eyes. Initially the twitching was her left eye, but for the past two days, the twitching has been her right eye. Lower lid with a constant pulsating feeling.  Occurs about 2-3 times pe

## 2020-10-14 NOTE — TELEPHONE ENCOUNTER
Pt calling back -- has not received any call back yet. She now has R eye twitching and blood in her stool. Please advise.

## 2020-10-26 ENCOUNTER — OFFICE VISIT (OUTPATIENT)
Dept: NEUROLOGY | Facility: CLINIC | Age: 30
End: 2020-10-26
Payer: MEDICAID

## 2020-10-26 VITALS
HEART RATE: 62 BPM | DIASTOLIC BLOOD PRESSURE: 70 MMHG | SYSTOLIC BLOOD PRESSURE: 118 MMHG | BODY MASS INDEX: 21.34 KG/M2 | WEIGHT: 125 LBS | HEIGHT: 64 IN

## 2020-10-26 DIAGNOSIS — R55 SYNCOPE, UNSPECIFIED SYNCOPE TYPE: ICD-10-CM

## 2020-10-26 DIAGNOSIS — R29.818 TRANSIENT NEUROLOGICAL SYMPTOMS: ICD-10-CM

## 2020-10-26 DIAGNOSIS — G37.9 DEMYELINATING DISEASE (HCC): Primary | ICD-10-CM

## 2020-10-26 DIAGNOSIS — R76.8 ANA POSITIVE: ICD-10-CM

## 2020-10-26 PROCEDURE — 3078F DIAST BP <80 MM HG: CPT | Performed by: OTHER

## 2020-10-26 PROCEDURE — 99214 OFFICE O/P EST MOD 30 MIN: CPT | Performed by: OTHER

## 2020-10-26 PROCEDURE — 3074F SYST BP LT 130 MM HG: CPT | Performed by: OTHER

## 2020-10-26 PROCEDURE — 3008F BODY MASS INDEX DOCD: CPT | Performed by: OTHER

## 2020-10-26 NOTE — PROGRESS NOTES
[facility] with [address]  6/25/1990  Primary Care Provider:  Martha Wilkinson MD    10/26/2020  Accompanied visit: father    ( ) No.        27year old yo patient being seen for: LA      Negative Negative   DRVVT Ratio      0.0 - 1.1 0.8   BETA 2 GLYCOPROTEIN 1 AB, IgG      <7 U/mL 1.9   BETA 2 GLYCOPROTEIN 1 AB, IgM      <7 U/mL <2.9   Cardiolipin IgG Antibody      <10 GPL 3.9   Cardiolipin IgM Antibody      <10 MPL 1.6   ELIANA Tite handed-off discharge process and instructions to the check out desk.   No additional issues raised to staff at this time interval.

## 2020-10-27 ENCOUNTER — NURSE TRIAGE (OUTPATIENT)
Dept: INTERNAL MEDICINE CLINIC | Facility: CLINIC | Age: 30
End: 2020-10-27

## 2020-10-27 NOTE — TELEPHONE ENCOUNTER
Spoke to patient, referred her to see gastroenterologist for evaluation of blood in the stool, advised her to avoid late meals, light dinner last meal 3 4 hours prior to laying down, trying to sleep elevated, eliminate caffeine in the afternoon, also discu

## 2020-10-27 NOTE — TELEPHONE ENCOUNTER
Action Requested: Summary for Provider     []  Critical Lab, Recommendations Needed  [x] Need Additional Advice  []   FYI    []   Need Orders  [] Need Medications Sent to Pharmacy  []  Other     SUMMARY: Patient reports blood in stool once a week x past co

## 2020-11-05 ENCOUNTER — OFFICE VISIT (OUTPATIENT)
Dept: RHEUMATOLOGY | Facility: CLINIC | Age: 30
End: 2020-11-05
Payer: MEDICAID

## 2020-11-05 VITALS
HEART RATE: 69 BPM | BODY MASS INDEX: 21.34 KG/M2 | DIASTOLIC BLOOD PRESSURE: 69 MMHG | HEIGHT: 64 IN | WEIGHT: 125 LBS | OXYGEN SATURATION: 100 % | SYSTOLIC BLOOD PRESSURE: 111 MMHG

## 2020-11-05 DIAGNOSIS — R76.8 POSITIVE ANA (ANTINUCLEAR ANTIBODY): Primary | ICD-10-CM

## 2020-11-05 PROCEDURE — 3008F BODY MASS INDEX DOCD: CPT | Performed by: INTERNAL MEDICINE

## 2020-11-05 PROCEDURE — 3074F SYST BP LT 130 MM HG: CPT | Performed by: INTERNAL MEDICINE

## 2020-11-05 PROCEDURE — 99244 OFF/OP CNSLTJ NEW/EST MOD 40: CPT | Performed by: INTERNAL MEDICINE

## 2020-11-05 PROCEDURE — 3078F DIAST BP <80 MM HG: CPT | Performed by: INTERNAL MEDICINE

## 2020-11-05 NOTE — PROGRESS NOTES
Lili Burt is a 27year old female who presents for No chief complaint on file. Nithin Vallejo HPI:   CC: +ELIANA  Consult: referred by PCP Dr. Nerissa Mello    This is a 26 yo F with hx of Anxiety/Depression Damien Montano with a positive ELIANA.   In the beginning of the year she f REVIEW OF SYSTEMS:   Review Of Systems:  Constitutional: No fever, no change in weight or appetitie  Derm: No rashes, no oral ulcers, no alopecia, no photosensitivity, no psoriasis  HEENT: No dry eyes, no dry mouth, no Raynaud's, no nasal ulcers, no pa <10 MPL 1.6   ELIANA Titer/Pattern      <80 160 (A)   Anti-Sjogren's A      Negative Negative   Anti-Sjogren's B      Negative Negative   Anti-Smith Antibody      Negative Negative   Anti-Porter/RNP Antibody      Negative Negative   C-REACTIVE PROTEIN      <0.

## 2020-11-05 NOTE — PATIENT INSTRUCTIONS
You were seen today for +ELIANA  Further blood work was negative for lupus  Can always follow up in a year to go over symptoms again and get blood work

## 2020-11-14 NOTE — H&P
[facility], [facility] - Gastroenterology                                                                                                               Reason for consult: rectal bleeding    Requesting physician or provider: Laverne Morris Last 6 Encounters:  11/17/20 : 130 lb (59 kg)  11/05/20 : 125 lb (56.7 kg)  10/26/20 : 125 lb (56.7 kg)  08/21/20 : 125 lb (56.7 kg)  07/27/20 : 127 lb (57.6 kg)  07/06/20 : 127 lb (57.6 kg)       History, Medications, Allergies, ROS:      Past Medical His swelling of joints  SKIN: No jaundice, no erythema, no rashes  HEMATOLOGIC: No bleeding, no bruising  NEURO: Alert and interactive, normal gait    Labs/Imaging/Procedures:     Recent Results (from the past 8760 hour(s))   CBC W/ DIFFERENTIAL    Collection Total Protein   6.4 - 8.2 g/dL 7.9    Albumin   3.4 - 5.0 g/dL 4.2    Globulin    2.8 - 4.4 g/dL 3.7    A/G Ratio   1.0 - 2.0 1.1    FASTING  No        Component   Ref Range & Units 4/20/20  2:28 PM   TSH   0.358 - 3.740 mIU/mL 1.140      Component   Ref bowel prep from pharmacy (split suprep)    3. Continue all medications for procedure    4. Read all bowel prep instructions carefully    5. AVOID seeds, nuts, popcorn, raw fruits and vegetables (cooked is okay) for 2-3 days before procedure    6.  You will

## 2020-11-17 ENCOUNTER — OFFICE VISIT (OUTPATIENT)
Dept: GASTROENTEROLOGY | Facility: CLINIC | Age: 30
End: 2020-11-17
Payer: MEDICAID

## 2020-11-17 ENCOUNTER — TELEPHONE (OUTPATIENT)
Dept: GASTROENTEROLOGY | Facility: CLINIC | Age: 30
End: 2020-11-17

## 2020-11-17 VITALS
HEIGHT: 64 IN | HEART RATE: 68 BPM | BODY MASS INDEX: 22.2 KG/M2 | WEIGHT: 130 LBS | DIASTOLIC BLOOD PRESSURE: 62 MMHG | SYSTOLIC BLOOD PRESSURE: 101 MMHG

## 2020-11-17 DIAGNOSIS — K62.5 BRBPR (BRIGHT RED BLOOD PER RECTUM): Primary | ICD-10-CM

## 2020-11-17 DIAGNOSIS — R11.2 NAUSEA AND VOMITING, INTRACTABILITY OF VOMITING NOT SPECIFIED, UNSPECIFIED VOMITING TYPE: ICD-10-CM

## 2020-11-17 DIAGNOSIS — R19.4 CHANGE IN BOWEL HABITS: ICD-10-CM

## 2020-11-17 DIAGNOSIS — R10.9 LEFT SIDED ABDOMINAL PAIN: ICD-10-CM

## 2020-11-17 DIAGNOSIS — R13.10 DYSPHAGIA, UNSPECIFIED TYPE: ICD-10-CM

## 2020-11-17 DIAGNOSIS — R63.0 DECREASED APPETITE: ICD-10-CM

## 2020-11-17 DIAGNOSIS — K62.5 BRIGHT RED BLOOD PER RECTUM: Primary | ICD-10-CM

## 2020-11-17 PROCEDURE — 3078F DIAST BP <80 MM HG: CPT | Performed by: NURSE PRACTITIONER

## 2020-11-17 PROCEDURE — 99244 OFF/OP CNSLTJ NEW/EST MOD 40: CPT | Performed by: NURSE PRACTITIONER

## 2020-11-17 PROCEDURE — 3008F BODY MASS INDEX DOCD: CPT | Performed by: NURSE PRACTITIONER

## 2020-11-17 PROCEDURE — 3074F SYST BP LT 130 MM HG: CPT | Performed by: NURSE PRACTITIONER

## 2020-11-17 RX ORDER — PANTOPRAZOLE SODIUM 40 MG/1
40 TABLET, DELAYED RELEASE ORAL
Qty: 30 TABLET | Refills: 3 | Status: SHIPPED | OUTPATIENT
Start: 2020-11-17 | End: 2020-12-17

## 2020-11-17 RX ORDER — SODIUM, POTASSIUM,MAG SULFATES 17.5-3.13G
SOLUTION, RECONSTITUTED, ORAL ORAL
Qty: 1 BOTTLE | Refills: 0 | Status: SHIPPED | OUTPATIENT
Start: 2020-11-17 | End: 2021-01-13

## 2020-11-17 NOTE — PATIENT INSTRUCTIONS
-labs  -x-ray  -start trial pantoprazole 40 mg/daily 30 min before first meal of the day  -stop marijuana, tobacco, alcohol    1.  Schedule colonoscopy/egd with [facility] w/ Dr. Salma Spence [Diagnosis: change in bm, brbpr, left sided pain, dysphagia, n/v, decreased manuel educational content on 6/1/2019  © 2599-1870 [employer]. [address] [facility], [address] [place]. All rights reserved. This information is not intended as a substitute for professional medical care.  Always follow your healthcare profession

## 2020-11-17 NOTE — TELEPHONE ENCOUNTER
Scheduled for:  Colonoscopy [phone]; egd [phone]  Provider Name:  Dr Ijeoma Gamboa  Date:  01/21/2021  Location:  [facility]  Sedation:  mac  Time:  10:30am (pt is aware to arrive at 0930)  Prep:  suprep  Meds/Allergies Reconciled?:  Physician reviewed  Diagnosis with codes:

## 2020-11-18 ENCOUNTER — TELEPHONE (OUTPATIENT)
Dept: GASTROENTEROLOGY | Facility: CLINIC | Age: 30
End: 2020-11-18

## 2020-11-18 NOTE — TELEPHONE ENCOUNTER
Vicky/pharmacy calling for mutual patient regarding rx:Suprep bowel, insurance does not cover, please advise an alternative at:[phone],thanks.

## 2020-11-19 NOTE — TELEPHONE ENCOUNTER
Procedure is not until 01/21/2021. Flagged chart closer to procedure.  Prep may be back in stock by then

## 2020-12-17 ENCOUNTER — TELEPHONE (OUTPATIENT)
Dept: GASTROENTEROLOGY | Facility: CLINIC | Age: 30
End: 2020-12-17

## 2020-12-17 NOTE — TELEPHONE ENCOUNTER
Overdue reminder letter mailed out to patient.     Labs/Imaging:    C-REACTIVE PROTEIN   CBC WITH DIFFERENTIAL WITH PLATELET   COMP METABOLIC PANEL (14)   IMMUNOGLOBULIN A, QNANT   SED RATE, WESTERGREN (AUTOMATED)   TISSUE TRANSGLUTAMINASE AB IGA   TSH W RE

## 2021-01-12 ENCOUNTER — APPOINTMENT (OUTPATIENT)
Dept: LAB | Age: 31
End: 2021-01-12
Attending: NURSE PRACTITIONER
Payer: MEDICAID

## 2021-01-12 ENCOUNTER — HOSPITAL ENCOUNTER (OUTPATIENT)
Dept: GENERAL RADIOLOGY | Age: 31
Discharge: HOME | End: 2021-01-12
Attending: NURSE PRACTITIONER
Payer: MEDICAID

## 2021-01-12 DIAGNOSIS — R19.4 CHANGE IN BOWEL HABITS: ICD-10-CM

## 2021-01-12 DIAGNOSIS — R63.0 DECREASED APPETITE: ICD-10-CM

## 2021-01-12 DIAGNOSIS — R10.9 LEFT SIDED ABDOMINAL PAIN: ICD-10-CM

## 2021-01-12 DIAGNOSIS — R11.2 NAUSEA AND VOMITING, INTRACTABILITY OF VOMITING NOT SPECIFIED, UNSPECIFIED VOMITING TYPE: ICD-10-CM

## 2021-01-12 PROCEDURE — 74018 RADEX ABDOMEN 1 VIEW: CPT | Performed by: NURSE PRACTITIONER

## 2021-01-13 ENCOUNTER — LAB ENCOUNTER (OUTPATIENT)
Dept: LAB | Age: 31
End: 2021-01-13
Attending: INTERNAL MEDICINE
Payer: MEDICAID

## 2021-01-13 ENCOUNTER — TELEPHONE (OUTPATIENT)
Dept: GASTROENTEROLOGY | Facility: CLINIC | Age: 31
End: 2021-01-13

## 2021-01-13 DIAGNOSIS — R63.0 DECREASED APPETITE: ICD-10-CM

## 2021-01-13 DIAGNOSIS — R19.4 CHANGE IN BOWEL HABITS: ICD-10-CM

## 2021-01-13 DIAGNOSIS — K62.5 BRBPR (BRIGHT RED BLOOD PER RECTUM): ICD-10-CM

## 2021-01-13 DIAGNOSIS — R11.2 NAUSEA AND VOMITING, INTRACTABILITY OF VOMITING NOT SPECIFIED, UNSPECIFIED VOMITING TYPE: ICD-10-CM

## 2021-01-13 DIAGNOSIS — K62.5 BRBPR (BRIGHT RED BLOOD PER RECTUM): Primary | ICD-10-CM

## 2021-01-13 DIAGNOSIS — R13.10 DYSPHAGIA, UNSPECIFIED TYPE: ICD-10-CM

## 2021-01-13 DIAGNOSIS — R10.9 LEFT SIDED ABDOMINAL PAIN: ICD-10-CM

## 2021-01-13 LAB
ALBUMIN SERPL-MCNC: 3.8 G/DL (ref 3.4–5)
ALBUMIN/GLOB SERPL: 1.1 {RATIO} (ref 1–2)
ALP LIVER SERPL-CCNC: 60 U/L
ALT SERPL-CCNC: 17 U/L
ANION GAP SERPL CALC-SCNC: 3 MMOL/L (ref 0–18)
AST SERPL-CCNC: 10 U/L (ref 15–37)
BASOPHILS # BLD AUTO: 0.03 X10(3) UL (ref 0–0.2)
BASOPHILS NFR BLD AUTO: 0.6 %
BILIRUB SERPL-MCNC: 2 MG/DL (ref 0.1–2)
BUN BLD-MCNC: 8 MG/DL (ref 7–18)
BUN/CREAT SERPL: 11.8 (ref 10–20)
CALCIUM BLD-MCNC: 9.4 MG/DL (ref 8.5–10.1)
CHLORIDE SERPL-SCNC: 106 MMOL/L (ref 98–112)
CO2 SERPL-SCNC: 29 MMOL/L (ref 21–32)
CREAT BLD-MCNC: 0.68 MG/DL
CRP SERPL-MCNC: <0.29 MG/DL (ref ?–0.3)
DEPRECATED RDW RBC AUTO: 38.8 FL (ref 35.1–46.3)
EOSINOPHIL # BLD AUTO: 0.19 X10(3) UL (ref 0–0.7)
EOSINOPHIL NFR BLD AUTO: 3.6 %
ERYTHROCYTE [DISTWIDTH] IN BLOOD BY AUTOMATED COUNT: 12.1 % (ref 11–15)
ERYTHROCYTE [SEDIMENTATION RATE] IN BLOOD: 10 MM/HR
GLOBULIN PLAS-MCNC: 3.5 G/DL (ref 2.8–4.4)
GLUCOSE BLD-MCNC: 86 MG/DL (ref 70–99)
HCT VFR BLD AUTO: 39.5 %
HGB BLD-MCNC: 13.1 G/DL
IGA SERPL-MCNC: 182 MG/DL (ref 70–312)
IMM GRANULOCYTES # BLD AUTO: 0.01 X10(3) UL (ref 0–1)
IMM GRANULOCYTES NFR BLD: 0.2 %
LYMPHOCYTES # BLD AUTO: 2.3 X10(3) UL (ref 1–4)
LYMPHOCYTES NFR BLD AUTO: 43.7 %
M PROTEIN MFR SERPL ELPH: 7.3 G/DL (ref 6.4–8.2)
MCH RBC QN AUTO: 29.1 PG (ref 26–34)
MCHC RBC AUTO-ENTMCNC: 33.2 G/DL (ref 31–37)
MCV RBC AUTO: 87.8 FL
MONOCYTES # BLD AUTO: 0.39 X10(3) UL (ref 0.1–1)
MONOCYTES NFR BLD AUTO: 7.4 %
NEUTROPHILS # BLD AUTO: 2.34 X10 (3) UL (ref 1.5–7.7)
NEUTROPHILS # BLD AUTO: 2.34 X10(3) UL (ref 1.5–7.7)
NEUTROPHILS NFR BLD AUTO: 44.5 %
OSMOLALITY SERPL CALC.SUM OF ELEC: 284 MOSM/KG (ref 275–295)
PATIENT FASTING Y/N/NP: YES
PLATELET # BLD AUTO: 203 10(3)UL (ref 150–450)
POTASSIUM SERPL-SCNC: 4.1 MMOL/L (ref 3.5–5.1)
RBC # BLD AUTO: 4.5 X10(6)UL
SODIUM SERPL-SCNC: 138 MMOL/L (ref 136–145)
TSI SER-ACNC: 1.74 MIU/ML (ref 0.36–3.74)
WBC # BLD AUTO: 5.3 X10(3) UL (ref 4–11)

## 2021-01-13 PROCEDURE — 85025 COMPLETE CBC W/AUTO DIFF WBC: CPT

## 2021-01-13 PROCEDURE — 85652 RBC SED RATE AUTOMATED: CPT

## 2021-01-13 PROCEDURE — 84443 ASSAY THYROID STIM HORMONE: CPT

## 2021-01-13 PROCEDURE — 36415 COLL VENOUS BLD VENIPUNCTURE: CPT

## 2021-01-13 PROCEDURE — 80053 COMPREHEN METABOLIC PANEL: CPT

## 2021-01-13 PROCEDURE — 82784 ASSAY IGA/IGD/IGG/IGM EACH: CPT

## 2021-01-13 PROCEDURE — 86140 C-REACTIVE PROTEIN: CPT

## 2021-01-13 PROCEDURE — 83516 IMMUNOASSAY NONANTIBODY: CPT

## 2021-01-13 RX ORDER — POLYETHYLENE GLYCOL 3350, SODIUM CHLORIDE, SODIUM BICARBONATE, POTASSIUM CHLORIDE 420; 11.2; 5.72; 1.48 G/4L; G/4L; G/4L; G/4L
POWDER, FOR SOLUTION ORAL
Qty: 1 BOTTLE | Refills: 0 | Status: ON HOLD | OUTPATIENT
Start: 2021-01-13 | End: 2021-01-21

## 2021-01-13 NOTE — TELEPHONE ENCOUNTER
Magdalene Leach-    Pt requesting to speak with you directly regarding her CLN/EGD (scheduled for 1/21/21). Please advise. Thank you!

## 2021-01-13 NOTE — TELEPHONE ENCOUNTER
Spoke to Pharmacist at [address] in [facility]. Currently have Trilyte in stock. Is it OK if we change prep to Trilyte? Per pt, Suprep cost $150.90; in which pt does not want to pay. Please sign pended orders on new prep if applicable.      Shyann French

## 2021-01-13 NOTE — TELEPHONE ENCOUNTER
Rescheduled for:  Colonoscopy 35255 and EGD 59231  Provider Name:  Dr. Laura Livingston  Date:  1/21/21  [facility]  Sedation:  MAC  Time:     From-1030 To-1000 (pt is aware to arrive at 0900)   Prep:  Suprep, sent new instructions via [address] [facility]

## 2021-01-13 NOTE — TELEPHONE ENCOUNTER
Candice Estrada    1/13/21 1:47 PM  Note     Pt called with questions about colon/EGD. Pt would like to speak directly to Remington if possible.   Please call.            1/13/21 1:47 PM    Edd Figueroa contacted Candice Estrada

## 2021-01-13 NOTE — TELEPHONE ENCOUNTER
Amparo Kinsey, April 1/12/21 4:56 PM  Note     Patient is having issues getting the prep medication drink for upcoming procedure due to her insurance not covering it Na Sulfate-K Sulfate-Mg Sulf (SUPREP BOWEL PREP KIT) 17.5-3.13-1.6 GM/177ML Oral Solution   .  P

## 2021-01-14 NOTE — TELEPHONE ENCOUNTER
Returned pt call.  kub c/w mild stool burden and she describes stools as softer. Recommended she start trial of benefiber once daily. We reviewed the risks/benefits of endoscopy. She reports that her symptoms are improving.   She read online about risk

## 2021-01-15 ENCOUNTER — OFFICE VISIT (OUTPATIENT)
Dept: RHEUMATOLOGY | Facility: CLINIC | Age: 31
End: 2021-01-15
Payer: MEDICAID

## 2021-01-15 VITALS
DIASTOLIC BLOOD PRESSURE: 60 MMHG | SYSTOLIC BLOOD PRESSURE: 100 MMHG | BODY MASS INDEX: 21.51 KG/M2 | RESPIRATION RATE: 18 BRPM | WEIGHT: 126 LBS | HEIGHT: 64 IN | HEART RATE: 74 BPM | TEMPERATURE: 98 F

## 2021-01-15 DIAGNOSIS — M62.838 MUSCLE SPASM: ICD-10-CM

## 2021-01-15 DIAGNOSIS — M62.838 MUSCLE SPASMS OF BOTH LOWER EXTREMITIES: ICD-10-CM

## 2021-01-15 DIAGNOSIS — L30.9 DERMATITIS: ICD-10-CM

## 2021-01-15 DIAGNOSIS — R90.82 WHITE MATTER ABNORMALITY ON MRI OF BRAIN: Primary | ICD-10-CM

## 2021-01-15 DIAGNOSIS — E55.9 VITAMIN D DEFICIENCY: ICD-10-CM

## 2021-01-15 DIAGNOSIS — R76.8 POSITIVE ANA (ANTINUCLEAR ANTIBODY): ICD-10-CM

## 2021-01-15 LAB — TTG IGA SER-ACNC: 0.3 U/ML (ref ?–7)

## 2021-01-15 PROCEDURE — 99245 OFF/OP CONSLTJ NEW/EST HI 55: CPT | Performed by: INTERNAL MEDICINE

## 2021-01-15 PROCEDURE — 3078F DIAST BP <80 MM HG: CPT | Performed by: INTERNAL MEDICINE

## 2021-01-15 PROCEDURE — 3074F SYST BP LT 130 MM HG: CPT | Performed by: INTERNAL MEDICINE

## 2021-01-15 PROCEDURE — 3008F BODY MASS INDEX DOCD: CPT | Performed by: INTERNAL MEDICINE

## 2021-01-15 NOTE — PAT NURSING NOTE
I spoke to the patient scheduled covid test, reviewed time, location, prep instructions for the patients Colonoscopy.

## 2021-01-15 NOTE — PATIENT INSTRUCTIONS
Current plan - recheck autoimmune tests. You have a positive ELIANA test, it could be a false psoitve or it could be assocaited with autoimmune disease such as Lupus. Also some cases of MS cause a positive ELIANA.   Also vitiligo, the white skin spots could be

## 2021-01-15 NOTE — PROGRESS NOTES
[facility]  Report of Consultation    Angelica Sophia Patient Status:  No patient class for patient encounter    1990 MRN [id number]   Location [address] [place] [address] PCP Priscila Posadas MD     Date of Consult:  1/15/21    Reason for Consultation equivalence if needed. (Patient not taking: Reported on 1/15/2021 ), Disp: 1 Bottle, Rfl: 0    Review of Systems:   No recent fever or chills. No recent weight loss or weight gain. No current shortness of breath or palpitations. Occasional chest pain. Beta-2 glycoprotein levels negative. Lupus anticoagulant screen negative. Imagin2020 unremarkable noncontrast MRI of the thoracic spine. 2020 MRA brain unremarkable.     20 MRI of the cervical spine shows moderate central canal narrowin weeks.    Thank you for allowing me to participate in the care of your patient.     Shayy Euceda MD  6/55/9905  2:25 PM

## 2021-01-18 ENCOUNTER — LAB ENCOUNTER (OUTPATIENT)
Dept: LAB | Age: 31
End: 2021-01-18
Attending: INTERNAL MEDICINE
Payer: MEDICAID

## 2021-01-18 DIAGNOSIS — Z01.818 PREOP TESTING: ICD-10-CM

## 2021-01-18 DIAGNOSIS — R76.8 POSITIVE ANA (ANTINUCLEAR ANTIBODY): ICD-10-CM

## 2021-01-18 DIAGNOSIS — R90.82 WHITE MATTER ABNORMALITY ON MRI OF BRAIN: ICD-10-CM

## 2021-01-18 DIAGNOSIS — M62.838 MUSCLE SPASMS OF BOTH LOWER EXTREMITIES: ICD-10-CM

## 2021-01-18 DIAGNOSIS — E55.9 VITAMIN D DEFICIENCY: ICD-10-CM

## 2021-01-18 LAB
C4 SERPL-MCNC: 16.5 MG/DL (ref 10–40)
FOLATE SERPL-MCNC: 9.5 NG/ML (ref 8.7–?)
VIT B12 SERPL-MCNC: 366 PG/ML (ref 193–986)

## 2021-01-18 PROCEDURE — 82306 VITAMIN D 25 HYDROXY: CPT

## 2021-01-18 PROCEDURE — 82607 VITAMIN B-12: CPT

## 2021-01-18 PROCEDURE — 36415 COLL VENOUS BLD VENIPUNCTURE: CPT

## 2021-01-18 PROCEDURE — 86038 ANTINUCLEAR ANTIBODIES: CPT

## 2021-01-18 PROCEDURE — 86039 ANTINUCLEAR ANTIBODIES (ANA): CPT

## 2021-01-18 PROCEDURE — 86225 DNA ANTIBODY NATIVE: CPT

## 2021-01-18 PROCEDURE — 86235 NUCLEAR ANTIGEN ANTIBODY: CPT

## 2021-01-18 PROCEDURE — 82300 ASSAY OF CADMIUM: CPT

## 2021-01-18 PROCEDURE — 83825 ASSAY OF MERCURY: CPT

## 2021-01-18 PROCEDURE — 82746 ASSAY OF FOLIC ACID SERUM: CPT

## 2021-01-18 PROCEDURE — 83655 ASSAY OF LEAD: CPT

## 2021-01-18 PROCEDURE — 86160 COMPLEMENT ANTIGEN: CPT

## 2021-01-18 PROCEDURE — 82175 ASSAY OF ARSENIC: CPT

## 2021-01-19 LAB
DSDNA AB TITR SER: <10 {TITER}
SARS-COV-2 RNA RESP QL NAA+PROBE: NOT DETECTED

## 2021-01-20 LAB — 25(OH)D3 SERPL-MCNC: 26.1 NG/ML (ref 30–100)

## 2021-01-21 ENCOUNTER — ANESTHESIA EVENT (OUTPATIENT)
Dept: ENDOSCOPY | Age: 31
End: 2021-01-21
Payer: MEDICAID

## 2021-01-21 ENCOUNTER — ANESTHESIA (OUTPATIENT)
Dept: ENDOSCOPY | Age: 31
End: 2021-01-21
Payer: MEDICAID

## 2021-01-21 ENCOUNTER — HOSPITAL ENCOUNTER (OUTPATIENT)
Age: 31
Setting detail: HOSPITAL OUTPATIENT SURGERY
Discharge: HOME | End: 2021-01-21
Attending: INTERNAL MEDICINE | Admitting: INTERNAL MEDICINE
Payer: MEDICAID

## 2021-01-21 ENCOUNTER — TELEPHONE (OUTPATIENT)
Dept: GASTROENTEROLOGY | Facility: CLINIC | Age: 31
End: 2021-01-21

## 2021-01-21 VITALS
WEIGHT: 125 LBS | TEMPERATURE: 98 F | HEART RATE: 77 BPM | SYSTOLIC BLOOD PRESSURE: 104 MMHG | DIASTOLIC BLOOD PRESSURE: 60 MMHG | BODY MASS INDEX: 21.34 KG/M2 | OXYGEN SATURATION: 99 % | RESPIRATION RATE: 16 BRPM | HEIGHT: 64 IN

## 2021-01-21 DIAGNOSIS — R63.0 DECREASED APPETITE: ICD-10-CM

## 2021-01-21 DIAGNOSIS — R10.9 LEFT SIDED ABDOMINAL PAIN: ICD-10-CM

## 2021-01-21 DIAGNOSIS — R11.2 NAUSEA AND VOMITING, INTRACTABILITY OF VOMITING NOT SPECIFIED, UNSPECIFIED VOMITING TYPE: ICD-10-CM

## 2021-01-21 DIAGNOSIS — R13.10 DYSPHAGIA, UNSPECIFIED TYPE: ICD-10-CM

## 2021-01-21 DIAGNOSIS — K62.5 BRBPR (BRIGHT RED BLOOD PER RECTUM): ICD-10-CM

## 2021-01-21 DIAGNOSIS — Z01.818 PREOP TESTING: Primary | ICD-10-CM

## 2021-01-21 LAB — NUCLEAR IGG TITR SER IF: POSITIVE {TITER}

## 2021-01-21 PROCEDURE — 43239 EGD BIOPSY SINGLE/MULTIPLE: CPT | Performed by: INTERNAL MEDICINE

## 2021-01-21 PROCEDURE — 45380 COLONOSCOPY AND BIOPSY: CPT | Performed by: INTERNAL MEDICINE

## 2021-01-21 PROCEDURE — 99070 SPECIAL SUPPLIES PHYS/QHP: CPT | Performed by: INTERNAL MEDICINE

## 2021-01-21 RX ORDER — SODIUM CHLORIDE, SODIUM LACTATE, POTASSIUM CHLORIDE, CALCIUM CHLORIDE 600; 310; 30; 20 MG/100ML; MG/100ML; MG/100ML; MG/100ML
INJECTION, SOLUTION INTRAVENOUS CONTINUOUS
Status: DISCONTINUED | OUTPATIENT
Start: 2021-01-21 | End: 2021-01-21

## 2021-01-21 RX ORDER — MIDAZOLAM HYDROCHLORIDE 1 MG/ML
INJECTION INTRAMUSCULAR; INTRAVENOUS AS NEEDED
Status: DISCONTINUED | OUTPATIENT
Start: 2021-01-21 | End: 2021-01-21 | Stop reason: SURG

## 2021-01-21 RX ORDER — SODIUM CHLORIDE, SODIUM LACTATE, POTASSIUM CHLORIDE, CALCIUM CHLORIDE 600; 310; 30; 20 MG/100ML; MG/100ML; MG/100ML; MG/100ML
INJECTION, SOLUTION INTRAVENOUS CONTINUOUS PRN
Status: DISCONTINUED | OUTPATIENT
Start: 2021-01-21 | End: 2021-01-21 | Stop reason: SURG

## 2021-01-21 RX ADMIN — MIDAZOLAM HYDROCHLORIDE 2 MG: 1 INJECTION INTRAMUSCULAR; INTRAVENOUS at 09:52:00

## 2021-01-21 RX ADMIN — SODIUM CHLORIDE, SODIUM LACTATE, POTASSIUM CHLORIDE, CALCIUM CHLORIDE: 600; 310; 30; 20 INJECTION, SOLUTION INTRAVENOUS at 09:40:00

## 2021-01-21 NOTE — H&P
Pre Procedure History & Physical Examination    Patient Name: Edwige Hill  MRN: [id number]  CSN: [id number]  YOB: 1990    Diagnosis: reflux, abdominal pain, dysphagia, loose stools, blood in the stool      •  PEG 3350-KCl-Na Bicarb-NaCl ( appears comfortable and in no acute discomfort  HEENT: the sclera appears anicteric, oropharynx clear, mucus membranes appear moist  CV: regular rate and rhythm, the extremities are warm and well perfused   Lung: Moves air well;  No labored breathing  Abdom

## 2021-01-21 NOTE — OPERATIVE REPORT
ESOPHAGOGASTRODUODENOSCOPY (EGD) & COLONOSCOPY REPORT    Dandre Bush DOB 1990 Age 27year old   PCP Gaby Vale MD Endoscopist nAdrew Hutchins MD     Date of procedure: 21    Procedure: EGD w/biopsies & Colonoscopy w/biopsies    Pre-operat washing. I then carefully withdrew the instrument from the patient who tolerated the procedure well. Complications: None    EGD findings:      1. Esophagus: The squamocolumnar junction was noted at 40 cm and appeared regular.  The GE junction was noted and distal esophageal biopsies, random colon biopsies

## 2021-01-21 NOTE — ANESTHESIA POSTPROCEDURE EVALUATION
Patient: Jamie Astudillo    Procedure Summary     Date: 01/21/21 Room / Location: [facility] ENDOSCOPY 01 / [facility] ENDO    Anesthesia Start: 8763 Anesthesia Stop:     Procedures:       COLONOSCOPY (N/A )      ESOPHAGOGASTRODUODENOSCOPY (EGD) (N/A ) Diagnosis:

## 2021-01-21 NOTE — TELEPHONE ENCOUNTER
Patient just had procedure and  called in stating when she is using the restroom there is blood.  Please follow up

## 2021-01-21 NOTE — TELEPHONE ENCOUNTER
Agree, short simple procedure. She had small hemorrhoids and biopsies taken.  If her pain worsens or a lot of blood, short of breath, chest pain or light headedness she should go to the ER

## 2021-01-21 NOTE — TELEPHONE ENCOUNTER
RN reached out to pt to relay Dr. Daniella Appiah message below. Pt voiced understanding and states, \"I think I am fine\" but will go to ER if symptoms do worsen. No further action required at this time. TE closed.

## 2021-01-21 NOTE — TELEPHONE ENCOUNTER
Dr. Yuli Webster-      RN returned pt's call. Verified . Pt reports \"Bleeding with blood clots when using the restroom about a tablespoon of blood\" Pt reports this episode has only occurred once and just had CLN/EGD procedure today about an hour ago.    Verónica Hernández

## 2021-01-21 NOTE — ANESTHESIA PREPROCEDURE EVALUATION
Anesthesia PreOp Note    HPI:     Uday Jackman is a 27year old female who presents for preoperative consultation requested by: Wilmer Aldana MD    Date of Surgery: 1/21/2021    Procedure(s):  COLONOSCOPY  ESOPHAGOGASTRODUODENOSCOPY (EGD)  Indication: B name: Not on file      Number of children: Not on file      Years of education: Not on file      Highest education level: Not on file    Occupational History      Not on file    Social Needs      Financial resource strain: Not on file      Food insecurity 01/13/2021    RBC 4.50 01/13/2021    HGB 13.1 01/13/2021    HCT 39.5 01/13/2021    MCV 87.8 01/13/2021    MCH 29.1 01/13/2021    MCHC 33.2 01/13/2021    RDW 12.1 01/13/2021    .0 01/13/2021     Lab Results   Component Value Date     01/13/2021

## 2021-01-22 LAB — B-HCG UR QL: NEGATIVE

## 2021-01-23 LAB — ANA NUCLEOLAR TITR SER IF: 80 {TITER}

## 2021-01-26 LAB
ENA SM IGG SER QL: NEGATIVE
ENA SM+RNP AB SER QL: NEGATIVE
ENA SS-A AB SER QL IA: NEGATIVE
ENA SS-B AB SER QL IA: NEGATIVE

## 2021-02-06 ENCOUNTER — LAB ENCOUNTER (OUTPATIENT)
Dept: LAB | Age: 31
End: 2021-02-06
Attending: INTERNAL MEDICINE
Payer: MEDICAID

## 2021-02-06 DIAGNOSIS — R76.8 POSITIVE ANA (ANTINUCLEAR ANTIBODY): ICD-10-CM

## 2021-02-06 DIAGNOSIS — M62.838 MUSCLE SPASMS OF BOTH LOWER EXTREMITIES: ICD-10-CM

## 2021-02-06 DIAGNOSIS — E55.9 VITAMIN D DEFICIENCY: ICD-10-CM

## 2021-02-06 DIAGNOSIS — R90.82 WHITE MATTER ABNORMALITY ON MRI OF BRAIN: ICD-10-CM

## 2021-02-06 PROCEDURE — 36415 COLL VENOUS BLD VENIPUNCTURE: CPT

## 2021-02-06 PROCEDURE — 82175 ASSAY OF ARSENIC: CPT

## 2021-02-06 PROCEDURE — 83655 ASSAY OF LEAD: CPT

## 2021-02-06 PROCEDURE — 83825 ASSAY OF MERCURY: CPT

## 2021-02-06 PROCEDURE — 82300 ASSAY OF CADMIUM: CPT

## 2021-02-11 LAB
ARSENIC, BLOOD: <10 UG/L
CADMIUM, BLOOD: <1 UG/L
LEAD, BLOOD (VENOUS): <2 UG/DL
MERCURY, BLOOD: <2.5 UG/L

## 2021-02-12 ENCOUNTER — TELEPHONE (OUTPATIENT)
Dept: GASTROENTEROLOGY | Facility: CLINIC | Age: 31
End: 2021-02-12

## 2021-02-12 ENCOUNTER — TELEPHONE (OUTPATIENT)
Dept: INTERNAL MEDICINE CLINIC | Facility: CLINIC | Age: 31
End: 2021-02-12

## 2021-02-12 NOTE — TELEPHONE ENCOUNTER
Dr. yM Rocha-    Pt called requesting interpretation on recent CLN procedure done on 01/21/2021. Please advise on biopsy results if applicable. Thank you!

## 2021-02-12 NOTE — TELEPHONE ENCOUNTER
Dr. Navneet Betancourt, patient is requesting an MRI order. Please advise if patient should schedule and office visit prior to order.

## 2021-02-12 NOTE — TELEPHONE ENCOUNTER
Patient is requesting an MRI of her arm and shoulder. She says it is a reoccurring injury. She is going to make an appointment on [facility] but wanted to have the order placed as well.

## 2021-02-12 NOTE — TELEPHONE ENCOUNTER
Pt  Should  See ortho  For  Evaluation of the injury? , triage if  Anything  Acute is going on, needs to be seen  Soon by any provider I the clinicotherwise  ,  she needs to be  evaluated ortho dr Mosquera/ [facility]  [phone], place referal if needed, MRI  requeired  Approval from insurance

## 2021-02-13 NOTE — TELEPHONE ENCOUNTER
Spoke to patient, shoulder pain is normal, no obvious injury, she never had similar pain in the past, she prefers to see orthopedic specialist provided her with a phone number names, referral placed, she has new job and prefers not to take time off from any appointments, also she states that she continues to have headache in the area of injury more than a year ago to her head, referred patient back to neurologist

## 2021-02-13 NOTE — TELEPHONE ENCOUNTER
Dr Flavia Hearn,   patient c/o R shoulder pain, 6/10, lack of dexterity in the R hand, the right hand sometimes feels cooler than the right but it is not discolored. the problem has been getting worse for the last year. She has always had weakness and dexterity problems of the R hand and arm, she thinks there might have been a birth injury, she was premature and a c section.  Patient was advised that you would refer her to ortho for eval and treatment, referral may take 3-5 business days  Please reply to pool: EM TRIAGE SUPPORT

## 2021-02-22 ENCOUNTER — TELEPHONE (OUTPATIENT)
Dept: RHEUMATOLOGY | Facility: CLINIC | Age: 31
End: 2021-02-22

## 2021-02-23 ENCOUNTER — TELEPHONE (OUTPATIENT)
Dept: RHEUMATOLOGY | Facility: CLINIC | Age: 31
End: 2021-02-23

## 2021-02-23 DIAGNOSIS — Z13.88 SCREENING FOR HEAVY METAL POISONING: Primary | ICD-10-CM

## 2021-02-23 NOTE — TELEPHONE ENCOUNTER
Phoned Herrera Pascual for arsenic testing-- 24 urine for heavy metals- includes arsenic for testing. Lab ordered  Phoned patient to inform lab ordered, she will need to  the 24 urine container from lab.  Further instructions will be given to patient at t

## 2021-02-23 NOTE — TELEPHONE ENCOUNTER
Regarding: rmi studies  ----- Message from Hung Dixon MD sent at 2/52/7082  5:54 PM CST -----  [place], call [facility] lab and see if they do a 24-hour urine for arsenic thanks.   Dr. Terrance Pickett has well water and is worried that she is getting arseni

## 2021-03-19 ENCOUNTER — TELEMEDICINE (OUTPATIENT)
Dept: RHEUMATOLOGY | Facility: CLINIC | Age: 31
End: 2021-03-19

## 2021-03-19 VITALS — HEIGHT: 64 IN | WEIGHT: 126 LBS | BODY MASS INDEX: 21.51 KG/M2

## 2021-03-19 DIAGNOSIS — R76.8 POSITIVE ANA (ANTINUCLEAR ANTIBODY): ICD-10-CM

## 2021-03-19 DIAGNOSIS — K90.0 CELIAC DISEASE: ICD-10-CM

## 2021-03-19 DIAGNOSIS — R90.82 WHITE MATTER ABNORMALITY ON MRI OF BRAIN: Primary | ICD-10-CM

## 2021-03-19 PROCEDURE — 3008F BODY MASS INDEX DOCD: CPT | Performed by: INTERNAL MEDICINE

## 2021-03-19 PROCEDURE — 99214 OFFICE O/P EST MOD 30 MIN: CPT | Performed by: INTERNAL MEDICINE

## 2021-03-19 NOTE — PATIENT INSTRUCTIONS
Current plan is to recheck the MRI of the brain. Last April lesions in the brain were suggestive of possible multiple sclerosis. However MRIs of the cervical spine and thoracic spine did not show any lesions suggestive of demyelination.   Lab test did alex

## 2021-03-19 NOTE — PROGRESS NOTES
This visit is conducted using Telemedicine with live, interactive video and audio. Patient has been referred to the [facility] website at [email or web address] to review the yearly Consent to Treat document.     Patient understands and accepts financial res sclerosis. Assessment:   White matter abnormality on mri of brain  (primary encounter diagnosis)  Positive jostin (antinuclear antibody)  Plan:   Patient Instructions   Current plan is to recheck the MRI of the brain.   Last April lesions in the brain were heaton

## 2021-03-24 ENCOUNTER — TELEPHONE (OUTPATIENT)
Dept: NEUROLOGY | Facility: CLINIC | Age: 31
End: 2021-03-24

## 2021-03-24 NOTE — TELEPHONE ENCOUNTER
Pt stated she is having a MRI Brain as ordered by Dr. Johnny Jefferson and asking if should have other imaging completed such as MRI Thoracic Spine. Call pt to verify.

## 2021-03-30 ENCOUNTER — TELEPHONE (OUTPATIENT)
Dept: RHEUMATOLOGY | Facility: CLINIC | Age: 31
End: 2021-03-30

## 2021-03-30 NOTE — TELEPHONE ENCOUNTER
Pt called again checking if needs to get MRI thoracic spine. Pt has MRI Brain scheduled tomorrow so needs to know by today if she should cancel and wait. Advised needs prior auth which could take up to a week with her insurance.   See previous note below

## 2021-03-30 NOTE — TELEPHONE ENCOUNTER
Pt calling back she would like to know if Dr. Tiffanie Quintero want's the MRI with contrast. She rather not but if Dr. Tiffanie Quintero says yes she will have the contrast.    Pt MRI appointment tomorrow at 1:30pm

## 2021-03-30 NOTE — TELEPHONE ENCOUNTER
RN spoke to the provider and he confirmed that she does not need to obtain any further imaging. LM with the above information. RN advised for the patient to call back with any further questions.

## 2021-03-31 NOTE — TELEPHONE ENCOUNTER
Patient would like to speak with Dr Martha Rebolledo only. Routed to Dr Martha Rebolldeo with high priority.

## 2021-03-31 NOTE — TELEPHONE ENCOUNTER
MRI of the spinal cord is not necessary at this time because prior cervical thoracic spine did not show any lesion. The main interest of follow-up would be the lesion seen in the brain.   Contrast is necessary to distinguish active disease

## 2021-03-31 NOTE — TELEPHONE ENCOUNTER
Patient called, requesting a call back from Dr. Marian Guzman. Confirmed patient does not want to speak to nurses.

## 2021-04-12 ENCOUNTER — HOSPITAL ENCOUNTER (OUTPATIENT)
Dept: GENERAL RADIOLOGY | Facility: HOSPITAL | Age: 31
Discharge: HOME | End: 2021-04-12
Attending: ORTHOPAEDIC SURGERY
Payer: MEDICAID

## 2021-04-12 ENCOUNTER — OFFICE VISIT (OUTPATIENT)
Dept: ORTHOPEDICS CLINIC | Facility: CLINIC | Age: 31
End: 2021-04-12
Payer: MEDICAID

## 2021-04-12 VITALS — WEIGHT: 130 LBS | BODY MASS INDEX: 22.2 KG/M2 | HEIGHT: 64 IN

## 2021-04-12 DIAGNOSIS — R52 PAIN: Primary | ICD-10-CM

## 2021-04-12 DIAGNOSIS — R52 PAIN: ICD-10-CM

## 2021-04-12 DIAGNOSIS — R27.8 INCOORDINATION OF EXTREMITY: ICD-10-CM

## 2021-04-12 PROCEDURE — 99243 OFF/OP CNSLTJ NEW/EST LOW 30: CPT | Performed by: ORTHOPAEDIC SURGERY

## 2021-04-12 PROCEDURE — 3008F BODY MASS INDEX DOCD: CPT | Performed by: ORTHOPAEDIC SURGERY

## 2021-04-12 PROCEDURE — 73130 X-RAY EXAM OF HAND: CPT | Performed by: ORTHOPAEDIC SURGERY

## 2021-04-12 PROCEDURE — 73030 X-RAY EXAM OF SHOULDER: CPT | Performed by: ORTHOPAEDIC SURGERY

## 2021-04-12 NOTE — PROGRESS NOTES
NURSING INTAKE COMMENTS: Patient presents with:  Consult: C/o congenitall R shoulder pain. No previous trauma or injury. Pt states \"I have mobility issues to my right hand. \" Denies pain, numbness and tingling at this time.        HPI: This 27year old fe Comment: occasional      Sexual activity: Yes       Review of Systems:  GENERAL: feels generally well, no significant weight loss or weight gain  SKIN: no ulcerated or worrisome skin lesions  EYES:denies blurred vision or double vision  HEENT: denies new prescribed occupational therapy to help her with her coordination and.   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    The above note was

## 2021-04-13 ENCOUNTER — TELEPHONE (OUTPATIENT)
Dept: NEUROLOGY | Facility: CLINIC | Age: 31
End: 2021-04-13

## 2021-04-13 NOTE — TELEPHONE ENCOUNTER
Patient wants to know if you want her to get an MRI Spine anytime this year. Patient has an appt on 04/29/2021.  Please advise

## 2021-04-16 ENCOUNTER — NURSE TRIAGE (OUTPATIENT)
Dept: INTERNAL MEDICINE CLINIC | Facility: CLINIC | Age: 31
End: 2021-04-16

## 2021-04-16 ENCOUNTER — PATIENT MESSAGE (OUTPATIENT)
Dept: INTERNAL MEDICINE CLINIC | Facility: CLINIC | Age: 31
End: 2021-04-16

## 2021-04-16 NOTE — TELEPHONE ENCOUNTER
From: Jeanne Peña  To: Ab Miller MD  Sent: 4/16/2021 9:52 AM CDT  Subject: Prescription Question    Hi Doctor [address],    I have an MRI with contrast Sunday and I believe I have an UTI. I got the UTI testing strips and my leukocytes were very high.

## 2021-04-16 NOTE — TELEPHONE ENCOUNTER
Action Requested: Summary for Provider     []  Critical Lab, Recommendations Needed  [x] Need Additional Advice  []   FYI    []   Need Orders  [] Need Medications Sent to Pharmacy  []  Other     SUMMARY: Please advise recommendations for concerns of UTI an

## 2021-04-16 NOTE — TELEPHONE ENCOUNTER
I will send prescription for UTI to the pharmacy, if bleeding continues she needs to see gynecologist and needs to be evaluated in office by myself to sort out if the bleeding related to kidney or menstrual.  If feeling worse over the weekend with a lot of

## 2021-04-16 NOTE — TELEPHONE ENCOUNTER
----- Message from William Burns sent at 4/16/2021  9:52 AM CDT -----  Regarding: Prescription Question  Contact: [phone]  Hi Doctor Florencio Ureña,    I have an MRI with contrast Sunday and I believe I have an UTI.  I got the UTI testing strips and my leuk

## 2021-04-18 ENCOUNTER — PATIENT MESSAGE (OUTPATIENT)
Dept: RHEUMATOLOGY | Facility: CLINIC | Age: 31
End: 2021-04-18

## 2021-04-19 ENCOUNTER — TELEPHONE (OUTPATIENT)
Dept: INTERNAL MEDICINE CLINIC | Facility: CLINIC | Age: 31
End: 2021-04-19

## 2021-04-19 NOTE — TELEPHONE ENCOUNTER
From: Dony Donahue  To: Kevon Demarco MD  Sent: 0/74/0960 3:14 PM CDT  Subject: Prescription Question    Good afternoon,    My primary care doctor prescribed me nitrofuran for my UTI but that can cause lupus in some people.  I think that's what I too

## 2021-04-20 NOTE — TELEPHONE ENCOUNTER
Paged re: nitrofurantoin. Chart reviewed. Took AZO x 2 days. Asking if she can take nitrofurantoin for her uti. Informed she can. Advised to call back with any questions.

## 2021-04-22 ENCOUNTER — OFFICE VISIT (OUTPATIENT)
Dept: INTERNAL MEDICINE CLINIC | Facility: CLINIC | Age: 31
End: 2021-04-22
Payer: MEDICAID

## 2021-04-22 ENCOUNTER — TELEPHONE (OUTPATIENT)
Dept: RHEUMATOLOGY | Facility: CLINIC | Age: 31
End: 2021-04-22

## 2021-04-22 ENCOUNTER — TELEPHONE (OUTPATIENT)
Dept: NEUROLOGY | Facility: CLINIC | Age: 31
End: 2021-04-22

## 2021-04-22 ENCOUNTER — LAB ENCOUNTER (OUTPATIENT)
Dept: LAB | Facility: HOSPITAL | Age: 31
End: 2021-04-22
Attending: NURSE PRACTITIONER
Payer: MEDICAID

## 2021-04-22 ENCOUNTER — TELEPHONE (OUTPATIENT)
Dept: INTERNAL MEDICINE CLINIC | Facility: CLINIC | Age: 31
End: 2021-04-22

## 2021-04-22 VITALS
WEIGHT: 136.13 LBS | SYSTOLIC BLOOD PRESSURE: 120 MMHG | BODY MASS INDEX: 23.24 KG/M2 | DIASTOLIC BLOOD PRESSURE: 79 MMHG | HEART RATE: 74 BPM | HEIGHT: 64 IN

## 2021-04-22 DIAGNOSIS — H53.8 BLURRY VISION, BILATERAL: ICD-10-CM

## 2021-04-22 DIAGNOSIS — R76.8 ANA POSITIVE: ICD-10-CM

## 2021-04-22 DIAGNOSIS — N89.8 VAGINAL ITCHING: Primary | ICD-10-CM

## 2021-04-22 DIAGNOSIS — R90.82 WHITE MATTER ABNORMALITY ON MRI OF BRAIN: ICD-10-CM

## 2021-04-22 DIAGNOSIS — R76.8 POSITIVE ANA (ANTINUCLEAR ANTIBODY): ICD-10-CM

## 2021-04-22 DIAGNOSIS — K90.0 CELIAC DISEASE: ICD-10-CM

## 2021-04-22 DIAGNOSIS — R90.82 WHITE MATTER DISEASE: Primary | ICD-10-CM

## 2021-04-22 DIAGNOSIS — Z72.51 UNPROTECTED SEX: ICD-10-CM

## 2021-04-22 PROCEDURE — 81003 URINALYSIS AUTO W/O SCOPE: CPT | Performed by: NURSE PRACTITIONER

## 2021-04-22 PROCEDURE — 3078F DIAST BP <80 MM HG: CPT | Performed by: NURSE PRACTITIONER

## 2021-04-22 PROCEDURE — 82784 ASSAY IGA/IGD/IGG/IGM EACH: CPT

## 2021-04-22 PROCEDURE — 86039 ANTINUCLEAR ANTIBODIES (ANA): CPT

## 2021-04-22 PROCEDURE — 3074F SYST BP LT 130 MM HG: CPT | Performed by: NURSE PRACTITIONER

## 2021-04-22 PROCEDURE — 3008F BODY MASS INDEX DOCD: CPT | Performed by: NURSE PRACTITIONER

## 2021-04-22 PROCEDURE — 86038 ANTINUCLEAR ANTIBODIES: CPT

## 2021-04-22 PROCEDURE — 86235 NUCLEAR ANTIGEN ANTIBODY: CPT

## 2021-04-22 PROCEDURE — 85652 RBC SED RATE AUTOMATED: CPT

## 2021-04-22 PROCEDURE — 36415 COLL VENOUS BLD VENIPUNCTURE: CPT

## 2021-04-22 PROCEDURE — 86225 DNA ANTIBODY NATIVE: CPT

## 2021-04-22 PROCEDURE — 81025 URINE PREGNANCY TEST: CPT

## 2021-04-22 PROCEDURE — 83516 IMMUNOASSAY NONANTIBODY: CPT

## 2021-04-22 PROCEDURE — 99214 OFFICE O/P EST MOD 30 MIN: CPT | Performed by: NURSE PRACTITIONER

## 2021-04-22 RX ORDER — METRONIDAZOLE 500 MG/1
500 TABLET ORAL 2 TIMES DAILY
Qty: 14 TABLET | Refills: 0 | Status: SHIPPED | OUTPATIENT
Start: 2021-04-22 | End: 2021-04-29 | Stop reason: ALTCHOICE

## 2021-04-22 NOTE — TELEPHONE ENCOUNTER
Patient has MRI with contrast on this upcoming Saturday, but she is really hoping that she can do this without contrast. She has a lot of concerns about the contrast including that she states she passed out 6 hours after she took the contrast last time.

## 2021-04-22 NOTE — TELEPHONE ENCOUNTER
. Taj Finney called, has urinary tract infection on nitrofurantoin, condition seems to be continuing and also having some vision change. Going to see her  primary later today.   Told her I agree with that plan no use taking medicine that was not helping an

## 2021-04-22 NOTE — TELEPHONE ENCOUNTER
Left message for the patient that I am concerned about her symptoms and she should be seen by any available physician perhaps anybody who works tomorrow, I can see her only on Tuesday at 5:00 or 6:20 not sooner than that, encourage patient to be seen as so

## 2021-04-22 NOTE — TELEPHONE ENCOUNTER
Patient is requesting an appointment for a possible UTI, offered appointment available at 2 pm for 4/23 but needs something after 5pm if possible.  Also offered to see another Dr but declined due to the appointment time available

## 2021-04-22 NOTE — TELEPHONE ENCOUNTER
Phoned pt for condition status, states she has a recent UTI, placed on antibiotics from her PCP. Has co visual disturbances. States while sitting \"vision moves up and down\" lasts for only 10-15 sec and then resolves.  Experiences only after taking her ant

## 2021-04-22 NOTE — PROGRESS NOTES
HPI:    Patient ID: Claire Mai is a 27year old female. March 26th 2021. Patient is sexually active with no birth control. One partner. Patient works in an office. UTI (pt stts she was prescribed antibiotics and had no relief.  she stts she is havi COLONOSCOPY;  Surgeon: Rosemarie Fragoso MD;  Location: [facility] ENDO      Social History    Socioeconomic History      Marital status: Single      Spouse name: Not on file      Number of children: Not on file      Years of education: Not on file      Highest educ 5 minutes, rinse.  Do daily when flared, then once to twice weekly for prevention (Patient not taking: Reported on 4/12/2021 ) 120 mL 5   • ketoconazole 2 % External Cream Apply a thin layer to aa bid until clear, then prn flares (Patient not taking: Report place, and time. Coordination: Coordination normal.      Gait: Gait normal.   Psychiatric:         Mood and Affect: Mood normal.         Behavior: Behavior normal.         Thought Content:  Thought content normal.         Judgment: Judgment normal. is safe. MRI scheduled for Saturday           Other Visit Diagnoses     Unprotected sex        Relevant Orders    PREGNANCY TEST, URINE           No follow-ups on file.     Orders Placed This Encounter      UA/M WITH CULTURE REFLEX [3020]      Pregnancy Te

## 2021-04-23 ENCOUNTER — PATIENT MESSAGE (OUTPATIENT)
Dept: INTERNAL MEDICINE CLINIC | Facility: CLINIC | Age: 31
End: 2021-04-23

## 2021-04-23 NOTE — ASSESSMENT & PLAN NOTE
A/P Patient states she is having blurry vision over the last several months.   She is going for an MRI with and without contrast.  She is anxious because in the past she had a head scan with contrast and she had a syncopal episode 6 hours after the exam.

## 2021-04-23 NOTE — TELEPHONE ENCOUNTER
Call her to reassure her  1)  contrast is necessary to look for active disease  2)  Hydrate so she will not pass out  3)  Benadryl 25 mg prior to MRI (have someone drive)  4)  offer Diazepam if anxious    Dr Aguila Luis

## 2021-04-23 NOTE — TELEPHONE ENCOUNTER
RN explained to the patient that she does need to have the contrast. Explained she needs to hydrate prior to the MRI. Offered Valium, pt declined. Pt verbalized understanding and did not have any further questions.

## 2021-04-23 NOTE — TELEPHONE ENCOUNTER
RN received a call from the patient and was informed the pt received a call from the patient informing her is was not 100% necessary for her to receive the contrast.     Pt was advised she should call central scheduling and inform them her MRI will be with

## 2021-04-23 NOTE — ASSESSMENT & PLAN NOTE
A/P 80-year-old female who originally tested her urine at the [facility] for positive leukocytes and was given nitrofurantoin toning 100 mg p.o. twice daily. Patient states at this visit she does not have any pain.   She did have a urgency and frequency but

## 2021-04-23 NOTE — TELEPHONE ENCOUNTER
From: Trula Fleischer  To: DANIELA Collazo  Sent: 4/23/2021 8:53 AM CDT  Subject: Prescription Question    It's not recommend that I stop taking the antibiotic for the uti since I don't have the infection anymore?  Is there a chance that I never had a uti

## 2021-04-26 ENCOUNTER — TELEPHONE (OUTPATIENT)
Dept: CT IMAGING | Facility: HOSPITAL | Age: 31
End: 2021-04-26

## 2021-04-26 ENCOUNTER — TELEPHONE (OUTPATIENT)
Dept: RHEUMATOLOGY | Facility: CLINIC | Age: 31
End: 2021-04-26

## 2021-04-26 NOTE — TELEPHONE ENCOUNTER
Call to pt to verify reported allergy at time of scheduling. Pt states she had MRI with gadolinium contrast 1 year ago. Went home and a couple of hours later while eating became hot and dizzy. She \"wokeup\" stumbled to bathroom and vomited.   She sts sh

## 2021-04-26 NOTE — PROGRESS NOTES
MRI with contrast canceled because patient is allergic to contrast Dr. Bear Murry ordered  plain MRI of brain.

## 2021-04-26 NOTE — TELEPHONE ENCOUNTER
[facility] phoned office, patient scheduled for MRI brain with contrast. Pt has had reaction to contrast in the past. Recent order for MRI brain without contrast per Dr. Terrence Huddleston. Dr. Mague Menchaca will need to cancel his order for MRI for patient safety.

## 2021-04-29 ENCOUNTER — OFFICE VISIT (OUTPATIENT)
Dept: NEUROLOGY | Facility: CLINIC | Age: 31
End: 2021-04-29
Payer: MEDICAID

## 2021-04-29 VITALS
DIASTOLIC BLOOD PRESSURE: 70 MMHG | HEART RATE: 68 BPM | HEIGHT: 64 IN | WEIGHT: 136 LBS | SYSTOLIC BLOOD PRESSURE: 118 MMHG | BODY MASS INDEX: 23.22 KG/M2 | RESPIRATION RATE: 16 BRPM

## 2021-04-29 DIAGNOSIS — R90.82 WHITE MATTER ABNORMALITY ON MRI OF BRAIN: Primary | ICD-10-CM

## 2021-04-29 DIAGNOSIS — R76.8 POSITIVE ANA (ANTINUCLEAR ANTIBODY): ICD-10-CM

## 2021-04-29 PROCEDURE — 3078F DIAST BP <80 MM HG: CPT | Performed by: OTHER

## 2021-04-29 PROCEDURE — 3074F SYST BP LT 130 MM HG: CPT | Performed by: OTHER

## 2021-04-29 PROCEDURE — 3008F BODY MASS INDEX DOCD: CPT | Performed by: OTHER

## 2021-04-29 PROCEDURE — 99214 OFFICE O/P EST MOD 30 MIN: CPT | Performed by: OTHER

## 2021-04-29 NOTE — PROGRESS NOTES
[facility] with [address]  6/25/1990  Primary Care Provider:  Ted Sena MD    4/29/2021  Accompanied visit:     (x) No.      27year old yo patient being seen for:  Problem/ pending      Problem/s Identified this visit:   White matter abnormality on MRI of brain  (primary encounter diagnosis)  Positive ELIANA (antinuclear antibody)      Discussion plus Diagnostics & Treatment Orders:  Awaiting MRI follow up and see if there will

## 2021-05-03 ENCOUNTER — TELEPHONE (OUTPATIENT)
Dept: NEUROLOGY | Facility: CLINIC | Age: 31
End: 2021-05-03

## 2021-05-03 NOTE — TELEPHONE ENCOUNTER
I spoke with Booker Cotton @ [facility] case review, in an attempt to have a reconsideration done as  per TE on 70/19/88 by Dr Shabana Taylor office patent can not have contrast.  She has informed me that the only reconsideration available per [facility]-ER plan is a peer Fax: [phone]     The written appeal should include the following information:   § Your name. § Your member number. § A phone number where we can reach you. § Why you think we should change the decision.    § Medical information to support t

## 2021-05-10 NOTE — TELEPHONE ENCOUNTER
Per notes below, peer to peer needed or appeal needs to be started. Unsure of status of either process. Patient is calling for status. Please advise.

## 2021-05-10 NOTE — TELEPHONE ENCOUNTER
RN will contact patient and advise her we have to obtain a new PA. RN spoke to the patient and informed her that we will be submitting for a new PA for the MRI of Brain with out contrast.  Patient is going to cancel any existing orders.  RN informed the

## 2021-05-11 NOTE — TELEPHONE ENCOUNTER
Spoke to [employer] and informed her that we would not be able to just start a new Pa as there is a denied case for the MRI Brain CPT 16687 order from Dr Veronica Stevenson on 04/26/21,  and that the only way to get an authorization  would be a written appeal or to

## 2021-05-14 NOTE — TELEPHONE ENCOUNTER
Received a fax from [email or web address] that the appeal that was faxed need to include a form signed by the patient (authorized representative designation form)  This appeal would need to be resubmitted with that signed form

## 2021-05-17 ENCOUNTER — TELEPHONE (OUTPATIENT)
Dept: NEUROLOGY | Facility: CLINIC | Age: 31
End: 2021-05-17

## 2021-05-17 NOTE — TELEPHONE ENCOUNTER
RN spoke to the patient and informed her that her insurance company requires her to sign a form prior to processing the PA. RN is sending the form via My Chart for the patient to sign and then she will send it back via my chart.

## 2021-05-26 ENCOUNTER — TELEPHONE (OUTPATIENT)
Dept: NEUROLOGY | Facility: CLINIC | Age: 31
End: 2021-05-26

## 2021-05-26 NOTE — TELEPHONE ENCOUNTER
Pt called to verify if received [employer] appeal form. Advised we received 2 pages attached to [employer] patient message. Call pt if need to correct any information.     See Patient Message 5/17/21

## 2021-05-28 NOTE — TELEPHONE ENCOUNTER
Faxed signed appeals letter re MRI Brain w/o contrast to Gerson Kidd and [address] [place] [address] 6 @ [phone].     Copied [place] to scan

## 2021-05-28 NOTE — TELEPHONE ENCOUNTER
Pt called to confirm she completed the paperwork correctly. Advised the appeal was sent this morning, so looks like everything is fine. Pt thanked me for assistance.

## 2021-06-02 NOTE — TELEPHONE ENCOUNTER
Pt checking on status of MRI appeal.  Advised appeal was sent on 5/28/21, probably need to allow 5-10 business days for response.

## 2021-06-21 ENCOUNTER — HOSPITAL ENCOUNTER (OUTPATIENT)
Dept: MRI IMAGING | Facility: HOSPITAL | Age: 31
Discharge: HOME | End: 2021-06-21
Attending: Other
Payer: MEDICAID

## 2021-06-21 DIAGNOSIS — G35 MULTIPLE SCLEROSIS (HCC): ICD-10-CM

## 2021-06-21 PROCEDURE — 70551 MRI BRAIN STEM W/O DYE: CPT | Performed by: OTHER

## 2021-07-09 ENCOUNTER — OFFICE VISIT (OUTPATIENT)
Dept: NEUROLOGY | Facility: CLINIC | Age: 31
End: 2021-07-09
Payer: MEDICAID

## 2021-07-09 VITALS
WEIGHT: 136 LBS | BODY MASS INDEX: 23.22 KG/M2 | DIASTOLIC BLOOD PRESSURE: 61 MMHG | HEIGHT: 64 IN | HEART RATE: 71 BPM | SYSTOLIC BLOOD PRESSURE: 109 MMHG | RESPIRATION RATE: 16 BRPM

## 2021-07-09 DIAGNOSIS — R76.8 POSITIVE ANA (ANTINUCLEAR ANTIBODY): ICD-10-CM

## 2021-07-09 DIAGNOSIS — R90.82 WHITE MATTER ABNORMALITY ON MRI OF BRAIN: Primary | ICD-10-CM

## 2021-07-09 PROCEDURE — 3074F SYST BP LT 130 MM HG: CPT | Performed by: OTHER

## 2021-07-09 PROCEDURE — 3078F DIAST BP <80 MM HG: CPT | Performed by: OTHER

## 2021-07-09 PROCEDURE — 3008F BODY MASS INDEX DOCD: CPT | Performed by: OTHER

## 2021-07-09 PROCEDURE — 99214 OFFICE O/P EST MOD 30 MIN: CPT | Performed by: OTHER

## 2021-07-09 NOTE — PROGRESS NOTES
[facility] with [address]  6/25/1990  Primary Care Provider:  Noah Waggoner MD    7/9/2021  Accompanied visit:     (x) No.      32year old yo patient being seen for:  [place] multiple sclerosis. Given a stable single lesion on the MRI, and I still do not see any pontine lesion that was previously described on a prior MRI, I believe the best approach is to just continue surveillance and do reevaluation if she has symptoms.   I d

## 2021-08-23 ENCOUNTER — OFFICE VISIT (OUTPATIENT)
Dept: INTERNAL MEDICINE CLINIC | Facility: CLINIC | Age: 31
End: 2021-08-23
Payer: MEDICAID

## 2021-08-23 VITALS
HEIGHT: 64 IN | BODY MASS INDEX: 22.71 KG/M2 | DIASTOLIC BLOOD PRESSURE: 78 MMHG | SYSTOLIC BLOOD PRESSURE: 92 MMHG | WEIGHT: 133 LBS | OXYGEN SATURATION: 98 % | HEART RATE: 87 BPM

## 2021-08-23 DIAGNOSIS — Z20.2 EXPOSURE TO SEXUALLY TRANSMITTED DISEASE (STD): Primary | ICD-10-CM

## 2021-08-23 LAB
APPEARANCE: CLEAR
BILIRUB UR QL: NEGATIVE
BILIRUBIN: NEGATIVE
CLARITY UR: CLEAR
COLOR UR: YELLOW
GLUCOSE (URINE DIPSTICK): NEGATIVE MG/DL
GLUCOSE UR-MCNC: NEGATIVE MG/DL
HGB UR QL STRIP.AUTO: NEGATIVE
KETONES (URINE DIPSTICK): NEGATIVE MG/DL
KETONES UR-MCNC: NEGATIVE MG/DL
MULTISTIX LOT#: 5077 NUMERIC
NITRITE UR QL STRIP.AUTO: NEGATIVE
NITRITE, URINE: NEGATIVE
PH UR: 7 [PH] (ref 5–8)
PH, URINE: 7 (ref 4.5–8)
PROT UR-MCNC: NEGATIVE MG/DL
PROTEIN (URINE DIPSTICK): NEGATIVE MG/DL
SP GR UR STRIP: 1.01 (ref 1–1.03)
SPECIFIC GRAVITY: 1.01 (ref 1–1.03)
URINE-COLOR: CLEAR
UROBILINOGEN UR STRIP-ACNC: <2
UROBILINOGEN,SEMI-QN: 0.2 MG/DL (ref 0–1.9)

## 2021-08-23 PROCEDURE — 99213 OFFICE O/P EST LOW 20 MIN: CPT | Performed by: NURSE PRACTITIONER

## 2021-08-23 PROCEDURE — 81003 URINALYSIS AUTO W/O SCOPE: CPT | Performed by: NURSE PRACTITIONER

## 2021-08-23 PROCEDURE — 3008F BODY MASS INDEX DOCD: CPT | Performed by: NURSE PRACTITIONER

## 2021-08-23 PROCEDURE — 3078F DIAST BP <80 MM HG: CPT | Performed by: NURSE PRACTITIONER

## 2021-08-23 PROCEDURE — 36415 COLL VENOUS BLD VENIPUNCTURE: CPT | Performed by: NURSE PRACTITIONER

## 2021-08-23 PROCEDURE — 3074F SYST BP LT 130 MM HG: CPT | Performed by: NURSE PRACTITIONER

## 2021-08-23 NOTE — PROGRESS NOTES
HPI:    Patient ID: Dario Pena is a 32year old female. Patient of Dr. Charlie Scales seen for the first time.     Wt Readings from Last 3 Encounters:  08/23/21 : 133 lb (60.3 kg)  07/09/21 : 136 lb (61.7 kg)  04/29/21 : 136 lb (61.7 kg)    BP Readings from Genitourinary: Positive for vaginal bleeding.  Negative for decreased urine volume, difficulty urinating, dyspareunia, dysuria, enuresis, flank pain, frequency, genital sores, hematuria, menstrual problem, pelvic pain, urgency, vaginal discharge and vagin the left side. Radial pulses are 2+ on the right side and 2+ on the left side. Heart sounds: Normal heart sounds, S1 normal and S2 normal.   Pulmonary:      Effort: Pulmonary effort is normal.      Breath sounds: Normal breath sounds.  No decreas provider for regular Pap tests, pelvic examinations, and periodic tests for STDs. Be aware of your partner's body. Look for signs of a sore, blister, rash, or discharge.    -Check your body frequently for signs of a sore, blister, rash, or discharge.     -

## 2021-08-23 NOTE — PATIENT INSTRUCTIONS
ASSESSMENT/PLAN:   Exposure to sexually transmitted disease (std)  (primary encounter diagnosis)  Check urine  Check blood  Abstain from sexual activities until cleared or 7 days after treatment.   Guidelines for Safer Sex    Limit your sexual activity to Imaging & Referrals:  None

## 2021-08-24 ENCOUNTER — PATIENT MESSAGE (OUTPATIENT)
Dept: INTERNAL MEDICINE CLINIC | Facility: CLINIC | Age: 31
End: 2021-08-24

## 2021-08-24 LAB
C TRACH DNA SPEC QL NAA+PROBE: NEGATIVE
N GONORRHOEA DNA SPEC QL NAA+PROBE: NEGATIVE

## 2021-08-24 NOTE — TELEPHONE ENCOUNTER
Katie Glover, please see patient message on mychart, do you have any recommendations for these \"bed bug bites\"? She was just seen yesterday by you.

## 2021-08-25 LAB — T PALLIDUM AB SER QL: NEGATIVE

## 2021-10-01 ENCOUNTER — OFFICE VISIT (OUTPATIENT)
Dept: INTERNAL MEDICINE CLINIC | Facility: CLINIC | Age: 31
End: 2021-10-01
Payer: MEDICAID

## 2021-10-01 VITALS
BODY MASS INDEX: 22.13 KG/M2 | SYSTOLIC BLOOD PRESSURE: 98 MMHG | WEIGHT: 129.63 LBS | HEIGHT: 64 IN | HEART RATE: 76 BPM | OXYGEN SATURATION: 98 % | DIASTOLIC BLOOD PRESSURE: 58 MMHG

## 2021-10-01 DIAGNOSIS — N89.8 VAGINAL DISCHARGE: Primary | ICD-10-CM

## 2021-10-01 DIAGNOSIS — R35.0 URINARY FREQUENCY: ICD-10-CM

## 2021-10-01 PROCEDURE — 3008F BODY MASS INDEX DOCD: CPT | Performed by: NURSE PRACTITIONER

## 2021-10-01 PROCEDURE — 3074F SYST BP LT 130 MM HG: CPT | Performed by: NURSE PRACTITIONER

## 2021-10-01 PROCEDURE — 3078F DIAST BP <80 MM HG: CPT | Performed by: NURSE PRACTITIONER

## 2021-10-01 PROCEDURE — 99214 OFFICE O/P EST MOD 30 MIN: CPT | Performed by: NURSE PRACTITIONER

## 2021-10-01 PROCEDURE — 81003 URINALYSIS AUTO W/O SCOPE: CPT | Performed by: NURSE PRACTITIONER

## 2021-10-01 RX ORDER — METRONIDAZOLE 7.5 MG/G
GEL VAGINAL
Qty: 70 G | Refills: 0 | Status: SHIPPED | OUTPATIENT
Start: 2021-10-01

## 2021-10-01 NOTE — PROGRESS NOTES
HPI:    Patient ID: Gia Morales is a 32year old female. Patient of Dr. Gely Villa presents to the clinic with complaints of intermittent vaginal yellow discharge and odor x1 year. On and off did not advised during last ov.  LMP 9/7/21 Reg. 2 days heav painful intercourse, rash, sore throat or vomiting. The vaginal discharge was yellow. The vaginal bleeding is typical of menses. Exacerbated by: menses. Treatments tried: boric acid. The treatment provided no relief. She is not sexually active.  Partner wit for headaches. Hematological: Negative. Psychiatric/Behavioral: Negative. Current Outpatient Medications   Medication Sig Dispense Refill   • metroNIDAZOLE 0.75 % Vaginal Gel Insert into the vagina once daily for 5 days.  70 g 0     Allergies: Conjunctivae normal.      Pupils: Pupils are equal, round, and reactive to light. Cardiovascular:      Rate and Rhythm: Normal rate and regular rhythm. Pulses: Normal pulses. Carotid pulses are 2+ on the right side and 2+ on the left side. soaps. Avoid douching.   Obtain Pap smear and culture results send to the office    Urinary frequency  Increase fluids  Check urine    Orders Placed This Encounter      URINALYSIS, AUTO, W/O SCOPE      Trichomonas vaginalis, JESSA      Urine Culture, Routine

## 2021-10-01 NOTE — PATIENT INSTRUCTIONS
ASSESSMENT/PLAN:   Vaginal discharge  (primary encounter diagnosis)  Ordered culture  Insert metronidazole gel once daily for 5 days. Wear cotton underwear  Use mild soap and water to wash vaginal area. Avoid new soaps. Avoid douching.   Obtain Pap sme transmitted infections (STIs)  · Infection after surgery on the reproductive organs  Home care  General care  · BV is most often treated with medicines called antibiotics.  These may be given as pills or as a vaginal cream. If antibiotics are prescribed, be

## 2021-10-06 ENCOUNTER — OFFICE VISIT (OUTPATIENT)
Dept: INTERNAL MEDICINE CLINIC | Facility: CLINIC | Age: 31
End: 2021-10-06
Payer: MEDICAID

## 2021-10-06 VITALS
BODY MASS INDEX: 22.02 KG/M2 | HEIGHT: 64 IN | DIASTOLIC BLOOD PRESSURE: 76 MMHG | WEIGHT: 129 LBS | SYSTOLIC BLOOD PRESSURE: 111 MMHG | HEART RATE: 78 BPM

## 2021-10-06 DIAGNOSIS — E55.9 VITAMIN D DEFICIENCY: ICD-10-CM

## 2021-10-06 DIAGNOSIS — R76.8 POSITIVE ANA (ANTINUCLEAR ANTIBODY): ICD-10-CM

## 2021-10-06 DIAGNOSIS — R89.9 ABNORMAL LABORATORY TEST: ICD-10-CM

## 2021-10-06 DIAGNOSIS — R53.83 OTHER FATIGUE: Primary | ICD-10-CM

## 2021-10-06 PROCEDURE — 3008F BODY MASS INDEX DOCD: CPT | Performed by: INTERNAL MEDICINE

## 2021-10-06 PROCEDURE — 3074F SYST BP LT 130 MM HG: CPT | Performed by: INTERNAL MEDICINE

## 2021-10-06 PROCEDURE — 99213 OFFICE O/P EST LOW 20 MIN: CPT | Performed by: INTERNAL MEDICINE

## 2021-10-06 PROCEDURE — 3078F DIAST BP <80 MM HG: CPT | Performed by: INTERNAL MEDICINE

## 2021-10-08 NOTE — PROGRESS NOTES
Subjective:   Patient ID: Nick Yan is a 32year old female.   Presents for evaluation of the fatigue    HPI  Patient reports that she continues to feel tired, she underwent extensive evaluation by neurologist, no diagnosis was established, at this po This Visit:  Requested Prescriptions      No prescriptions requested or ordered in this encounter     Follow-up as needed  Imaging & Referrals:  None

## 2021-10-09 ENCOUNTER — HOSPITAL ENCOUNTER (OUTPATIENT)
Age: 31
Discharge: HOME | End: 2021-10-09
Payer: MEDICAID

## 2021-10-09 VITALS
RESPIRATION RATE: 18 BRPM | SYSTOLIC BLOOD PRESSURE: 110 MMHG | DIASTOLIC BLOOD PRESSURE: 74 MMHG | TEMPERATURE: 98 F | HEART RATE: 66 BPM | OXYGEN SATURATION: 99 %

## 2021-10-09 DIAGNOSIS — R22.0 SWELLING OF UPPER LIP: Primary | ICD-10-CM

## 2021-10-09 DIAGNOSIS — T78.40XA ALLERGIC REACTION, INITIAL ENCOUNTER: ICD-10-CM

## 2021-10-09 PROCEDURE — 99214 OFFICE O/P EST MOD 30 MIN: CPT

## 2021-10-09 PROCEDURE — 99213 OFFICE O/P EST LOW 20 MIN: CPT

## 2021-10-09 RX ORDER — HYDROXYZINE HYDROCHLORIDE 25 MG/1
25 TABLET, FILM COATED ORAL 3 TIMES DAILY PRN
Qty: 10 TABLET | Refills: 0 | Status: SHIPPED | OUTPATIENT
Start: 2021-10-09

## 2021-10-09 RX ORDER — EPINEPHRINE 0.3 MG/.3ML
0.3 INJECTION SUBCUTANEOUS AS NEEDED
Qty: 1 EACH | Refills: 0 | Status: SHIPPED | OUTPATIENT
Start: 2021-10-09

## 2021-10-09 RX ORDER — PREDNISONE 20 MG/1
40 TABLET ORAL DAILY
Qty: 8 TABLET | Refills: 0 | Status: SHIPPED | OUTPATIENT
Start: 2021-10-09 | End: 2021-10-13

## 2021-10-09 RX ORDER — PREDNISONE 20 MG/1
40 TABLET ORAL ONCE
Status: COMPLETED | OUTPATIENT
Start: 2021-10-09 | End: 2021-10-09

## 2021-10-09 NOTE — ED INITIAL ASSESSMENT (HPI)
Woke up this morning with lip swelling, tingling, and mild itching. No tongue swelling. No SOB. Speech normal. No redness or rash.

## 2021-10-09 NOTE — ED PROVIDER NOTES
Patient Seen in: [facility] [place]      History   Patient presents with: Allergic Rxn Allergies    Stated Complaint: allergic reaction    Subjective:   HPI    This is a 15-year-old female presenting with lip swelling.   Patient states this morning s to palpation no drainage present no tongue or throat swelling  Eyes:      Conjunctiva/sclera: Conjunctivae normal.   Cardiovascular:      Rate and Rhythm: Normal rate. Pulmonary:      Effort: Pulmonary effort is normal. No respiratory distress.       [facility] PM    START taking these medications    predniSONE 20 MG Oral Tab  Take 2 tablets (40 mg total) by mouth daily for 4 days. , Normal, Disp-8 tablet, R-0    hydrOXYzine 25 MG Oral Tab  Take 1 tablet (25 mg total) by mouth 3 (three) times daily as needed for I

## 2021-10-15 ENCOUNTER — OFFICE VISIT (OUTPATIENT)
Dept: OBGYN CLINIC | Facility: CLINIC | Age: 31
End: 2021-10-15
Payer: MEDICAID

## 2021-10-15 VITALS
DIASTOLIC BLOOD PRESSURE: 60 MMHG | SYSTOLIC BLOOD PRESSURE: 92 MMHG | HEIGHT: 64 IN | BODY MASS INDEX: 21.68 KG/M2 | WEIGHT: 127 LBS

## 2021-10-15 DIAGNOSIS — R10.2 PELVIC PAIN: ICD-10-CM

## 2021-10-15 DIAGNOSIS — N88.9 CERVICAL LESION: Primary | ICD-10-CM

## 2021-10-15 PROCEDURE — 3074F SYST BP LT 130 MM HG: CPT | Performed by: OBSTETRICS & GYNECOLOGY

## 2021-10-15 PROCEDURE — 3008F BODY MASS INDEX DOCD: CPT | Performed by: OBSTETRICS & GYNECOLOGY

## 2021-10-15 PROCEDURE — 99203 OFFICE O/P NEW LOW 30 MIN: CPT | Performed by: OBSTETRICS & GYNECOLOGY

## 2021-10-15 PROCEDURE — 3078F DIAST BP <80 MM HG: CPT | Performed by: OBSTETRICS & GYNECOLOGY

## 2021-10-15 NOTE — PROGRESS NOTES
[facility], [facility]  Obstetrics and Gynecology  Focused Gynecology Problem Exam  Caden Ortiz MD    Dmoinique Garza is a 32year old female presenting for Gyn Exam (NP - possible lesion in cervix, vaginal discharge) and Vaginal Bleeding (after intercourse) Past Medical History:   Diagnosis Date   • Acne    • Anxiety    • Depression        Past Surgical History:   Procedure Laterality Date   • COLONOSCOPY N/A 1/21/2021    Procedure: COLONOSCOPY;  Surgeon: Joceline Lui MD;  Location: [facility]       Fam Lack of Transportation (Non-Medical):  Not on file  Physical Activity:       Days of Exercise per Week: Not on file      Minutes of Exercise per Session: Not on file  Stress:       Feeling of Stress : Not on file  Social Connections:       Frequency of Comm pelvic pain. I discussed that based on her exam and clinical presentation of pelvic pain that she is unlikely to have significant gynecologic pathology.   We discussed other causes of pelvic pain including urologic, GI and musculoskeletal.  We discussed op

## 2021-10-21 ENCOUNTER — TELEPHONE (OUTPATIENT)
Dept: INTERNAL MEDICINE CLINIC | Facility: CLINIC | Age: 31
End: 2021-10-21

## 2021-10-21 NOTE — TELEPHONE ENCOUNTER
Patient called asking if  can order a repeat test to check for STD. Patient states she went to gynecologist, nothing abnormal found. Patient reports still having vaginal pain. Please advise.

## 2021-10-26 ENCOUNTER — TELEPHONE (OUTPATIENT)
Dept: INTERNAL MEDICINE CLINIC | Facility: CLINIC | Age: 31
End: 2021-10-26

## 2021-10-26 NOTE — TELEPHONE ENCOUNTER
Spoke with pt,  verified. Pt stated she called last week regarding STD test but never heard back. Patient called asking if  can order a repeat test to check for STD. Patient states she went to gynecologist, nothing abnormal found.    Patient

## 2021-10-28 NOTE — TELEPHONE ENCOUNTER
Spoke to patient, reviewed her concerns, order for STD testing placed, she is having allergic reaction to something that she is not sure what getting swollen lip sporadically happens usually in the evening when she is at home, I advised patient to make a d

## 2021-11-16 ENCOUNTER — OFFICE VISIT (OUTPATIENT)
Dept: INTERNAL MEDICINE CLINIC | Facility: CLINIC | Age: 31
End: 2021-11-16
Payer: MEDICAID

## 2021-11-16 VITALS
RESPIRATION RATE: 18 BRPM | BODY MASS INDEX: 22.26 KG/M2 | HEART RATE: 74 BPM | DIASTOLIC BLOOD PRESSURE: 63 MMHG | SYSTOLIC BLOOD PRESSURE: 98 MMHG | WEIGHT: 130.38 LBS | HEIGHT: 64 IN

## 2021-11-16 DIAGNOSIS — N89.8 VAGINAL ITCHING: Primary | ICD-10-CM

## 2021-11-16 PROCEDURE — 3008F BODY MASS INDEX DOCD: CPT | Performed by: INTERNAL MEDICINE

## 2021-11-16 PROCEDURE — 99213 OFFICE O/P EST LOW 20 MIN: CPT | Performed by: INTERNAL MEDICINE

## 2021-11-16 PROCEDURE — 3074F SYST BP LT 130 MM HG: CPT | Performed by: INTERNAL MEDICINE

## 2021-11-16 PROCEDURE — 3078F DIAST BP <80 MM HG: CPT | Performed by: INTERNAL MEDICINE

## 2021-11-16 NOTE — PROGRESS NOTES
Subjective:   Patient ID: Viktor Koenig is a 32year old female. HPI  Patient complains of recurrent vaginal discharge. Was tested for STD but no bacterial vaginosis screen in August 2021, she is not sexually active lately.   She was diagnosed for rudolph swab.  Orders Placed This Encounter      Chlamydia/Gc Amplification [E]    Follow-up cultures results treat if necessary  Meds This Visit:  Requested Prescriptions      No prescriptions requested or ordered in this encounter       Imaging & Referrals:  Non

## 2021-11-26 ENCOUNTER — TELEPHONE (OUTPATIENT)
Dept: GASTROENTEROLOGY | Facility: CLINIC | Age: 31
End: 2021-11-26

## 2021-11-26 DIAGNOSIS — E80.6 HYPERBILIRUBINEMIA: Primary | ICD-10-CM

## 2021-11-26 NOTE — TELEPHONE ENCOUNTER
Pt states that Dr. Alexander Osorio ordered labs and her bilirubin levels are elevated. 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 Kyung Cheng or Sami Chaudhry if there is any testing that can be done. Please call.

## 2021-11-26 NOTE — TELEPHONE ENCOUNTER
[facility]--    Patient last seen in clinic 11/17/2020 2/2 evaluation for rectal bleeding & underwent colonoscopy/egd with Dr. Kermit Goldberg 01/2021. Findings suggested small internal hemorrhoids but otherwise normal scope with mild reflux on upper.      Complete

## 2021-11-30 NOTE — TELEPHONE ENCOUNTER
Recommend fractionated bili and plan for appt as scheduled, unless having abd pain/clinically indicated. Orders placed for labs.     Thanks,  c

## 2021-11-30 NOTE — TELEPHONE ENCOUNTER
Natalie Mercado to review active orders and schedule consultation. Verified . Open slot for tomorrow at 2:30 PM available which patient accepted.      Discussed active labs and ensured preferred lab location entered is [facility]. She will complete prior

## 2021-12-01 ENCOUNTER — OFFICE VISIT (OUTPATIENT)
Dept: GASTROENTEROLOGY | Facility: CLINIC | Age: 31
End: 2021-12-01
Payer: MEDICAID

## 2021-12-01 VITALS
HEART RATE: 74 BPM | HEIGHT: 64 IN | DIASTOLIC BLOOD PRESSURE: 81 MMHG | BODY MASS INDEX: 22.36 KG/M2 | WEIGHT: 131 LBS | SYSTOLIC BLOOD PRESSURE: 117 MMHG | TEMPERATURE: 99 F

## 2021-12-01 DIAGNOSIS — E80.6 HYPERBILIRUBINEMIA: Primary | ICD-10-CM

## 2021-12-01 DIAGNOSIS — K59.00 CONSTIPATION, UNSPECIFIED CONSTIPATION TYPE: ICD-10-CM

## 2021-12-01 PROCEDURE — 3079F DIAST BP 80-89 MM HG: CPT | Performed by: NURSE PRACTITIONER

## 2021-12-01 PROCEDURE — 3074F SYST BP LT 130 MM HG: CPT | Performed by: NURSE PRACTITIONER

## 2021-12-01 PROCEDURE — 99215 OFFICE O/P EST HI 40 MIN: CPT | Performed by: NURSE PRACTITIONER

## 2021-12-01 PROCEDURE — 3008F BODY MASS INDEX DOCD: CPT | Performed by: NURSE PRACTITIONER

## 2021-12-01 NOTE — PROGRESS NOTES
[facility], [facility] - Gastroenterology                                                                                                               Reason for consult: f/u    Requesting physician or provider: MD Seb Henriquez MD;  Location: [address] [place] [address],[facility] 1 ENDO      Family Hx:   Family History   Problem Relation Age of Onset   • Lipids Father         per Ng: hyperlipidemia   • Allergies Mother    • Lipids Mother         per NG: hyperlipidemia   • Diabetes Other    • Uterine Cancer Other work of breathing  ABDOMEN: No scars, normal bowel sounds, soft, non-tender, non-distended no rebound or guarding, no masses, no hepatomegaly  MSK: No redness, no warmth, no swelling of joints  SKIN: No jaundice, no erythema, no rashes  HEMATOLOGIC: No ble history of acne, anxiety, depression:    #constipation  Not on bowel regimen and recommend she start miralax    #hyperbilirubinemia  Bili elevated on last cmp. AST/ALT/Alk phos normal.  No epigastric pain.   Suspect gilbert's and fractionated bili ordered a

## 2021-12-31 ENCOUNTER — TELEPHONE (OUTPATIENT)
Dept: GASTROENTEROLOGY | Facility: CLINIC | Age: 31
End: 2021-12-31

## 2021-12-31 NOTE — TELEPHONE ENCOUNTER
1st reminder letter sent out via mail and [employer] for the following:    BILIRUBIN, TOTAL/DIRECT, SERUM (Order #[id number]) on 11/30/21  PANCREATIC ELASTASE, FECAL (Order #[id number]) on 12/1/21

## 2022-04-12 ENCOUNTER — TELEPHONE (OUTPATIENT)
Dept: OBGYN CLINIC | Facility: CLINIC | Age: 32
End: 2022-04-12

## 2022-04-15 ENCOUNTER — NURSE TRIAGE (OUTPATIENT)
Dept: INTERNAL MEDICINE CLINIC | Facility: CLINIC | Age: 32
End: 2022-04-15

## 2022-04-15 NOTE — TELEPHONE ENCOUNTER
I can do a diaphragm fitting visit but she would need to be scheduled in an office that has a fitting kit for diaphragm.

## 2022-04-18 NOTE — TELEPHONE ENCOUNTER
Pt again asking about a prescription for her condition on the corners of her mouth. States that it doesn't hurt but she has had this in the past and it required a fungal cream to clear it up. Was living in [place] when this med was first prescribed. (not sure of the name of the med). Advised to send a picture via My chart.

## 2022-04-18 NOTE — TELEPHONE ENCOUNTER
Is end rx for miconazole cream, not sure if it will be covered by insurance, [please let pt know if not  covered  will try nystatin cream  and will see for coverage

## 2022-04-18 NOTE — TELEPHONE ENCOUNTER
RN called patient. Patient's date of birth and full name both confirmed. RN informed patient of provider's message as detailed below. She verbalizes understanding and is agreeable to instructions. Instructed on Sig of prescription. She verbalizes understanding and is agreeable to instructions.

## 2022-04-25 NOTE — TELEPHONE ENCOUNTER
Patient name and  verified. Patient scheduled for  at 10:50 am at [facility]. Patient verified date and location.

## 2022-04-26 NOTE — TELEPHONE ENCOUNTER
Please have the MA coming to [place] on 5/4/2022 bring the diaphragm fitting kit that day or have someone bring it to me at one of the other offices prior to that date.

## 2022-05-04 ENCOUNTER — OFFICE VISIT (OUTPATIENT)
Dept: OBGYN CLINIC | Facility: CLINIC | Age: 32
End: 2022-05-04
Payer: MEDICAID

## 2022-05-04 VITALS — SYSTOLIC BLOOD PRESSURE: 107 MMHG | DIASTOLIC BLOOD PRESSURE: 64 MMHG

## 2022-05-04 DIAGNOSIS — R39.9 UTI SYMPTOMS: Primary | ICD-10-CM

## 2022-05-04 DIAGNOSIS — Z30.8 ENCOUNTER FOR DIAPHRAGM FITTING: ICD-10-CM

## 2022-05-04 PROCEDURE — 99213 OFFICE O/P EST LOW 20 MIN: CPT | Performed by: OBSTETRICS & GYNECOLOGY

## 2022-05-04 PROCEDURE — 57170 FITTING OF DIAPHRAGM/CAP: CPT | Performed by: OBSTETRICS & GYNECOLOGY

## 2022-05-04 PROCEDURE — 3078F DIAST BP <80 MM HG: CPT | Performed by: OBSTETRICS & GYNECOLOGY

## 2022-05-04 PROCEDURE — 3074F SYST BP LT 130 MM HG: CPT | Performed by: OBSTETRICS & GYNECOLOGY

## 2022-06-28 ENCOUNTER — OFFICE VISIT (OUTPATIENT)
Dept: NEUROLOGY | Facility: CLINIC | Age: 32
End: 2022-06-28
Payer: MEDICAID

## 2022-06-28 VITALS
SYSTOLIC BLOOD PRESSURE: 118 MMHG | HEIGHT: 64 IN | BODY MASS INDEX: 22.36 KG/M2 | DIASTOLIC BLOOD PRESSURE: 60 MMHG | HEART RATE: 88 BPM | RESPIRATION RATE: 16 BRPM | WEIGHT: 131 LBS

## 2022-06-28 DIAGNOSIS — R90.82 WHITE MATTER ABNORMALITY ON MRI OF BRAIN: Primary | ICD-10-CM

## 2022-06-28 DIAGNOSIS — R29.818 TRANSIENT NEUROLOGICAL SYMPTOMS: ICD-10-CM

## 2022-06-28 DIAGNOSIS — R76.8 POSITIVE ANA (ANTINUCLEAR ANTIBODY): ICD-10-CM

## 2022-06-28 PROCEDURE — 99214 OFFICE O/P EST MOD 30 MIN: CPT | Performed by: OTHER

## 2022-06-28 PROCEDURE — 3074F SYST BP LT 130 MM HG: CPT | Performed by: OTHER

## 2022-06-28 PROCEDURE — 3078F DIAST BP <80 MM HG: CPT | Performed by: OTHER

## 2022-06-28 PROCEDURE — 3008F BODY MASS INDEX DOCD: CPT | Performed by: OTHER

## 2022-07-08 ENCOUNTER — TELEPHONE (OUTPATIENT)
Dept: ADMINISTRATIVE | Age: 32
End: 2022-07-08

## 2022-07-08 DIAGNOSIS — R76.8 POSITIVE ANA (ANTINUCLEAR ANTIBODY): ICD-10-CM

## 2022-07-08 DIAGNOSIS — R90.82 WHITE MATTER ABNORMALITY ON MRI OF BRAIN: Primary | ICD-10-CM

## 2022-07-08 DIAGNOSIS — R29.818 TRANSIENT NEUROLOGICAL SYMPTOMS: ICD-10-CM

## 2022-07-11 NOTE — TELEPHONE ENCOUNTER
Notified patient that MRI Brain W+WO has been placed and that she should call [facility] to schedule test, and that will trigger PA. Pt verbalized understanding, agrees to plan, and expresses intent to comply with recommendations given.

## 2022-07-11 NOTE — TELEPHONE ENCOUNTER
Pt stated MRI was denied and requested to have MRI appealed. Pt stated she is going to see a rheumatologist and would like to have MRI completed prior to the appt. Call pt to discuss reason for denial and length of this process to be appealed.

## 2022-07-15 ENCOUNTER — TELEPHONE (OUTPATIENT)
Dept: NEUROLOGY | Facility: CLINIC | Age: 32
End: 2022-07-15

## 2022-07-15 DIAGNOSIS — R76.8 POSITIVE ANA (ANTINUCLEAR ANTIBODY): ICD-10-CM

## 2022-07-15 DIAGNOSIS — R90.82 WHITE MATTER ABNORMALITY ON MRI OF BRAIN: Primary | ICD-10-CM

## 2022-07-15 DIAGNOSIS — R29.818 TRANSIENT NEUROLOGICAL SYMPTOMS: ICD-10-CM

## 2022-07-15 NOTE — IMAGING NOTE
Pt noted to have reported allergy to MRI contrast.  No allergy listed in Epic. Call to pt, pt sts she does not know if it was an allergic reaction, but last time she got contrast she went home and 6 hours later\"lost consciousness and vomited\". Pt did call her doctor and advised to monitor symptoms and go to ER if worsening. See Dr. Roseanne Gerardo note 5/1/2020 post episode. Call to Dr. Dyer Going office at this time as pt wants to refuse contrast.  No allergy recorded in Epic and risk of non payment if pt refuses contrast.  Spoke with Altagracia Florez RN, she will reach out to pt to discuss plan.

## 2022-07-15 NOTE — TELEPHONE ENCOUNTER
Dayana PALOMO from Radiology stating that patient is refusing contrast with MRI brain. MRI Brain without contrast was denied by insurance  MRI Brain with/without was approved by insurance      Informed patient that she can get MRI without contrast, but it may not be covered by insurance and would result in a large bill for patient. Insists that she does not want contrast.      Patient will contact insurance company to inquire why MRI Brain without contrast was covered last year and not this year. Patient to follow up with this office per MyChart or phone call. Is aware that we are closed at 1pm and will re-open on Monday @ 8.

## 2022-07-18 NOTE — TELEPHONE ENCOUNTER
MRI Brain without contrast not covered by insurance. Patient declines MRI Brain w/wo, does not want contrast.  Per insurance company, new order needed for MRI Brain WITHOUT contrast AND comment WHY without contrast needed. Note, MRI Brain [id number] was original order. Routed to provider for comment/edit. MRI schedule for 7/20/2022.

## 2022-07-20 ENCOUNTER — HOSPITAL ENCOUNTER (OUTPATIENT)
Dept: MRI IMAGING | Facility: HOSPITAL | Age: 32
Discharge: HOME | End: 2022-07-20
Attending: Other
Payer: MEDICAID

## 2022-07-20 ENCOUNTER — LAB ENCOUNTER (OUTPATIENT)
Dept: LAB | Age: 32
End: 2022-07-20
Attending: INTERNAL MEDICINE
Payer: MEDICAID

## 2022-07-20 ENCOUNTER — OFFICE VISIT (OUTPATIENT)
Dept: RHEUMATOLOGY | Facility: CLINIC | Age: 32
End: 2022-07-20
Payer: MEDICAID

## 2022-07-20 VITALS
WEIGHT: 122 LBS | DIASTOLIC BLOOD PRESSURE: 62 MMHG | HEART RATE: 75 BPM | HEIGHT: 64 IN | TEMPERATURE: 98 F | BODY MASS INDEX: 20.83 KG/M2 | OXYGEN SATURATION: 98 % | SYSTOLIC BLOOD PRESSURE: 94 MMHG

## 2022-07-20 DIAGNOSIS — R76.8 ANA POSITIVE: ICD-10-CM

## 2022-07-20 DIAGNOSIS — R90.82 WHITE MATTER DISEASE: Primary | ICD-10-CM

## 2022-07-20 DIAGNOSIS — R20.0 NUMBNESS: ICD-10-CM

## 2022-07-20 DIAGNOSIS — R22.0 LIP SWELLING: ICD-10-CM

## 2022-07-20 DIAGNOSIS — R76.8 POSITIVE ANA (ANTINUCLEAR ANTIBODY): Primary | ICD-10-CM

## 2022-07-20 DIAGNOSIS — R90.82 WHITE MATTER ABNORMALITY ON MRI OF BRAIN: ICD-10-CM

## 2022-07-20 DIAGNOSIS — R76.8 POSITIVE ANA (ANTINUCLEAR ANTIBODY): ICD-10-CM

## 2022-07-20 DIAGNOSIS — R29.818 TRANSIENT NEUROLOGICAL SYMPTOMS: ICD-10-CM

## 2022-07-20 LAB
C4 SERPL-MCNC: 21.4 MG/DL (ref 10–40)
ERYTHROCYTE [SEDIMENTATION RATE] IN BLOOD: 10 MM/HR
IGE SERPL-ACNC: 75.4 IU/ML (ref 3.6–114)

## 2022-07-20 PROCEDURE — 3008F BODY MASS INDEX DOCD: CPT | Performed by: INTERNAL MEDICINE

## 2022-07-20 PROCEDURE — 3074F SYST BP LT 130 MM HG: CPT | Performed by: INTERNAL MEDICINE

## 2022-07-20 PROCEDURE — 99214 OFFICE O/P EST MOD 30 MIN: CPT | Performed by: INTERNAL MEDICINE

## 2022-07-20 PROCEDURE — 86038 ANTINUCLEAR ANTIBODIES: CPT

## 2022-07-20 PROCEDURE — 83516 IMMUNOASSAY NONANTIBODY: CPT

## 2022-07-20 PROCEDURE — 3078F DIAST BP <80 MM HG: CPT | Performed by: INTERNAL MEDICINE

## 2022-07-20 RX ORDER — PREDNISONE 1 MG/1
TABLET ORAL
Qty: 60 TABLET | Refills: 1 | Status: SHIPPED | OUTPATIENT
Start: 2022-07-20

## 2022-07-20 NOTE — PATIENT INSTRUCTIONS
Labs ordered for autoimmune disease. For lip swelling use benadryl as needed. Epi Pen for severe facial and throat swelling. Prednisone aslo for facial and lip swelling if needed. Start with 4 tablets of 5 mg dose and taper don. See allergist about swelling. Dr. Jeremias Weir in [place], or allergists in my building. I will call you with lab results. If the specialist at [facility] can figure out your problem, and I would recommend you either go to the [address] [place] [address] or [facility], or possibly [facility] for further evaluation. RTO 6 months.

## 2022-07-22 LAB — RIBOSOMAL P ANTIBODY: 4 AU/ML

## 2022-07-25 ENCOUNTER — TELEPHONE (OUTPATIENT)
Dept: RHEUMATOLOGY | Facility: CLINIC | Age: 32
End: 2022-07-25

## 2022-07-25 LAB — ANA SER QL: NEGATIVE

## 2022-07-29 ENCOUNTER — HOSPITAL ENCOUNTER (OUTPATIENT)
Dept: MRI IMAGING | Facility: HOSPITAL | Age: 32
Discharge: HOME | End: 2022-07-29
Attending: Other
Payer: MEDICAID

## 2022-07-29 DIAGNOSIS — R76.8 POSITIVE ANA (ANTINUCLEAR ANTIBODY): ICD-10-CM

## 2022-07-29 DIAGNOSIS — R29.818 TRANSIENT NEUROLOGICAL SYMPTOMS: ICD-10-CM

## 2022-07-29 DIAGNOSIS — R90.82 WHITE MATTER ABNORMALITY ON MRI OF BRAIN: ICD-10-CM

## 2022-07-29 PROCEDURE — 70551 MRI BRAIN STEM W/O DYE: CPT | Performed by: OTHER

## 2022-08-02 ENCOUNTER — TELEPHONE (OUTPATIENT)
Dept: RHEUMATOLOGY | Facility: CLINIC | Age: 32
End: 2022-08-02

## 2022-08-02 ENCOUNTER — TELEPHONE (OUTPATIENT)
Dept: GASTROENTEROLOGY | Facility: CLINIC | Age: 32
End: 2022-08-02

## 2022-08-02 DIAGNOSIS — E80.6 HYPERBILIRUBINEMIA: Primary | ICD-10-CM

## 2022-08-02 NOTE — TELEPHONE ENCOUNTER
Patient is calling because she had gone to [facility] to do her bilirubin test and was told that it had been ordered as a  test. Please send a corrected lab order.

## 2022-08-02 NOTE — TELEPHONE ENCOUNTER
Test results discussed with Darek Dunn at [facility] negative. EST 10  IgE level normal.  C4 level normal at 21. Ribosomal Antibodies negative. [facility] test low likelihood of Lupus. 2 borderlinetests found. ELIANA by IgG wa 38, normal is < 20, strong positive > 60. DS DNA  228, normal < 200, strong positive over 300. Both results equivocal.  Suggest referral to tertiary center like [facility] or [address] [place] [address] for evaluation of white matter changes on the brain. It suggest early MS to me.   Dr. Sean Phelps

## 2022-08-02 NOTE — TELEPHONE ENCOUNTER
Renate--    Current bilirubin ordered as a  draw. Please change and submit to [facility] as resulting lab.      Thank you

## 2022-08-02 NOTE — TELEPHONE ENCOUNTER
Spoke with Zuly Brown. Relayed new orders placed but original was entered as an adult draw. Should be no issues with completing lab now. Picked up stool kit for pancreatic elastase today as well. Appreciative for call back.

## 2022-08-02 NOTE — TELEPHONE ENCOUNTER
Nursing:  Order placed for bilirubin direct 2021 at [facility].  It does not say  on the order, but I did place new orders for [facility].    Thanks,  Flavia Garza

## 2022-08-08 ENCOUNTER — OFFICE VISIT (OUTPATIENT)
Dept: INTERNAL MEDICINE CLINIC | Facility: CLINIC | Age: 32
End: 2022-08-08
Payer: MEDICAID

## 2022-08-08 VITALS
DIASTOLIC BLOOD PRESSURE: 72 MMHG | WEIGHT: 117 LBS | HEIGHT: 64 IN | HEART RATE: 98 BPM | SYSTOLIC BLOOD PRESSURE: 112 MMHG | BODY MASS INDEX: 19.97 KG/M2 | OXYGEN SATURATION: 97 %

## 2022-08-08 DIAGNOSIS — T78.3XXA ANGIOEDEMA, INITIAL ENCOUNTER: ICD-10-CM

## 2022-08-08 DIAGNOSIS — R58 ECCHYMOSIS: Primary | ICD-10-CM

## 2022-08-08 PROBLEM — N89.8 VAGINAL ITCHING: Status: RESOLVED | Noted: 2021-04-22 | Resolved: 2022-08-08

## 2022-08-08 PROBLEM — L30.9 DERMATITIS: Status: RESOLVED | Noted: 2021-01-15 | Resolved: 2022-08-08

## 2022-08-08 LAB
BILIRUBIN, DIRECT: 0.2 MG/DL
PANCREATIC ELASTASE-1: >500 MCG/G

## 2022-08-08 PROCEDURE — 3008F BODY MASS INDEX DOCD: CPT | Performed by: INTERNAL MEDICINE

## 2022-08-08 PROCEDURE — 3078F DIAST BP <80 MM HG: CPT | Performed by: INTERNAL MEDICINE

## 2022-08-08 PROCEDURE — 3074F SYST BP LT 130 MM HG: CPT | Performed by: INTERNAL MEDICINE

## 2022-08-08 PROCEDURE — 99214 OFFICE O/P EST MOD 30 MIN: CPT | Performed by: INTERNAL MEDICINE

## 2022-08-10 ENCOUNTER — LAB ENCOUNTER (OUTPATIENT)
Dept: LAB | Age: 32
End: 2022-08-10
Attending: INTERNAL MEDICINE
Payer: MEDICAID

## 2022-08-10 DIAGNOSIS — R58 ECCHYMOSIS: Primary | ICD-10-CM

## 2022-08-20 ENCOUNTER — LAB ENCOUNTER (OUTPATIENT)
Dept: LAB | Age: 32
End: 2022-08-20
Attending: INTERNAL MEDICINE
Payer: MEDICAID

## 2022-08-20 DIAGNOSIS — R58 ECCHYMOSIS: ICD-10-CM

## 2022-08-20 LAB
ALBUMIN SERPL-MCNC: 3.7 G/DL (ref 3.4–5)
ALBUMIN/GLOB SERPL: 0.9 {RATIO} (ref 1–2)
ALP LIVER SERPL-CCNC: 55 U/L
ALT SERPL-CCNC: 20 U/L
ANION GAP SERPL CALC-SCNC: 6 MMOL/L (ref 0–18)
APTT PPP: 28 SECONDS (ref 23.3–35.6)
AST SERPL-CCNC: 15 U/L (ref 15–37)
BASOPHILS # BLD AUTO: 0.04 X10(3) UL (ref 0–0.2)
BASOPHILS NFR BLD AUTO: 0.6 %
BILIRUB SERPL-MCNC: 1.4 MG/DL (ref 0.1–2)
BUN BLD-MCNC: 10 MG/DL (ref 7–18)
BUN/CREAT SERPL: 15.2 (ref 10–20)
CALCIUM BLD-MCNC: 9.2 MG/DL (ref 8.5–10.1)
CHLORIDE SERPL-SCNC: 106 MMOL/L (ref 98–112)
CO2 SERPL-SCNC: 25 MMOL/L (ref 21–32)
CREAT BLD-MCNC: 0.66 MG/DL
DEPRECATED RDW RBC AUTO: 41 FL (ref 35.1–46.3)
EOSINOPHIL # BLD AUTO: 0.32 X10(3) UL (ref 0–0.7)
EOSINOPHIL NFR BLD AUTO: 4.9 %
ERYTHROCYTE [DISTWIDTH] IN BLOOD BY AUTOMATED COUNT: 12.6 % (ref 11–15)
FASTING STATUS PATIENT QL REPORTED: YES
GFR SERPLBLD BASED ON 1.73 SQ M-ARVRAT: 119 ML/MIN/1.73M2 (ref 60–?)
GLOBULIN PLAS-MCNC: 4 G/DL (ref 2.8–4.4)
GLUCOSE BLD-MCNC: 88 MG/DL (ref 70–99)
HCT VFR BLD AUTO: 41.4 %
HGB BLD-MCNC: 13.5 G/DL
IMM GRANULOCYTES # BLD AUTO: 0.02 X10(3) UL (ref 0–1)
IMM GRANULOCYTES NFR BLD: 0.3 %
INR BLD: 0.98 (ref 0.85–1.16)
LYMPHOCYTES # BLD AUTO: 2.47 X10(3) UL (ref 1–4)
LYMPHOCYTES NFR BLD AUTO: 37.8 %
MCH RBC QN AUTO: 29 PG (ref 26–34)
MCHC RBC AUTO-ENTMCNC: 32.6 G/DL (ref 31–37)
MCV RBC AUTO: 88.8 FL
MONOCYTES # BLD AUTO: 0.48 X10(3) UL (ref 0.1–1)
MONOCYTES NFR BLD AUTO: 7.4 %
NEUTROPHILS # BLD AUTO: 3.2 X10 (3) UL (ref 1.5–7.7)
NEUTROPHILS # BLD AUTO: 3.2 X10(3) UL (ref 1.5–7.7)
NEUTROPHILS NFR BLD AUTO: 49 %
OSMOLALITY SERPL CALC.SUM OF ELEC: 282 MOSM/KG (ref 275–295)
PLATELET # BLD AUTO: 219 10(3)UL (ref 150–450)
POTASSIUM SERPL-SCNC: 4 MMOL/L (ref 3.5–5.1)
PROT SERPL-MCNC: 7.7 G/DL (ref 6.4–8.2)
PROTHROMBIN TIME: 12.9 SECONDS (ref 11.6–14.8)
RBC # BLD AUTO: 4.66 X10(6)UL
SODIUM SERPL-SCNC: 137 MMOL/L (ref 136–145)
WBC # BLD AUTO: 6.5 X10(3) UL (ref 4–11)

## 2022-08-20 PROCEDURE — 85025 COMPLETE CBC W/AUTO DIFF WBC: CPT | Performed by: INTERNAL MEDICINE

## 2022-08-20 PROCEDURE — 84597 ASSAY OF VITAMIN K: CPT

## 2022-08-20 PROCEDURE — 80053 COMPREHEN METABOLIC PANEL: CPT | Performed by: INTERNAL MEDICINE

## 2022-08-20 PROCEDURE — 85610 PROTHROMBIN TIME: CPT | Performed by: INTERNAL MEDICINE

## 2022-08-20 PROCEDURE — 36415 COLL VENOUS BLD VENIPUNCTURE: CPT | Performed by: INTERNAL MEDICINE

## 2022-08-20 PROCEDURE — 85730 THROMBOPLASTIN TIME PARTIAL: CPT | Performed by: INTERNAL MEDICINE

## 2022-08-23 ENCOUNTER — APPOINTMENT (OUTPATIENT)
Dept: HEMATOLOGY/ONCOLOGY | Facility: HOSPITAL | Age: 32
End: 2022-08-23
Attending: INTERNAL MEDICINE
Payer: MEDICAID

## 2022-08-25 LAB — VITAMIN K1, SERUM: 0.51 NMOL/L

## 2022-09-01 ENCOUNTER — OFFICE VISIT (OUTPATIENT)
Dept: INTERNAL MEDICINE CLINIC | Facility: CLINIC | Age: 32
End: 2022-09-01
Payer: MEDICAID

## 2022-09-01 ENCOUNTER — LAB ENCOUNTER (OUTPATIENT)
Dept: LAB | Age: 32
End: 2022-09-01
Attending: NURSE PRACTITIONER
Payer: MEDICAID

## 2022-09-01 VITALS
HEIGHT: 64 IN | WEIGHT: 120.5 LBS | DIASTOLIC BLOOD PRESSURE: 66 MMHG | HEART RATE: 72 BPM | SYSTOLIC BLOOD PRESSURE: 99 MMHG | BODY MASS INDEX: 20.57 KG/M2 | RESPIRATION RATE: 16 BRPM

## 2022-09-01 DIAGNOSIS — Z20.2 EXPOSURE TO SEXUALLY TRANSMITTED DISEASE (STD): Primary | ICD-10-CM

## 2022-09-01 DIAGNOSIS — F17.200 NICOTINE DEPENDENCE WITH CURRENT USE: ICD-10-CM

## 2022-09-01 LAB
HAV IGM SER QL: NONREACTIVE
HBV CORE IGM SER QL: NONREACTIVE
HBV SURFACE AG SERPL QL IA: NONREACTIVE
HCV AB SERPL QL IA: NONREACTIVE

## 2022-09-01 PROCEDURE — 99213 OFFICE O/P EST LOW 20 MIN: CPT | Performed by: NURSE PRACTITIONER

## 2022-09-01 PROCEDURE — 87389 HIV-1 AG W/HIV-1&-2 AB AG IA: CPT | Performed by: NURSE PRACTITIONER

## 2022-09-01 PROCEDURE — 87491 CHLMYD TRACH DNA AMP PROBE: CPT | Performed by: NURSE PRACTITIONER

## 2022-09-01 PROCEDURE — 86780 TREPONEMA PALLIDUM: CPT | Performed by: NURSE PRACTITIONER

## 2022-09-01 PROCEDURE — 3078F DIAST BP <80 MM HG: CPT | Performed by: NURSE PRACTITIONER

## 2022-09-01 PROCEDURE — 87591 N.GONORRHOEAE DNA AMP PROB: CPT | Performed by: NURSE PRACTITIONER

## 2022-09-01 PROCEDURE — 3074F SYST BP LT 130 MM HG: CPT | Performed by: NURSE PRACTITIONER

## 2022-09-01 PROCEDURE — 36415 COLL VENOUS BLD VENIPUNCTURE: CPT | Performed by: NURSE PRACTITIONER

## 2022-09-01 PROCEDURE — 80074 ACUTE HEPATITIS PANEL: CPT | Performed by: NURSE PRACTITIONER

## 2022-09-01 PROCEDURE — 3008F BODY MASS INDEX DOCD: CPT | Performed by: NURSE PRACTITIONER

## 2022-09-02 LAB
C TRACH DNA SPEC QL NAA+PROBE: NEGATIVE
N GONORRHOEA DNA SPEC QL NAA+PROBE: NEGATIVE
T PALLIDUM AB SER QL: NEGATIVE

## 2022-09-05 ENCOUNTER — TELEPHONE (OUTPATIENT)
Dept: INTERNAL MEDICINE CLINIC | Facility: CLINIC | Age: 32
End: 2022-09-05

## 2022-09-05 DIAGNOSIS — K92.1 BLOOD IN STOOL: Primary | ICD-10-CM

## 2022-09-05 NOTE — TELEPHONE ENCOUNTER
So multiple on-call page is received. Every time call went straight back to voicemail. Did leave 2 voicemails and encourage patient to go to the emergency room for her symptoms.

## 2022-09-06 ENCOUNTER — OFFICE VISIT (OUTPATIENT)
Dept: INTERNAL MEDICINE CLINIC | Facility: CLINIC | Age: 32
End: 2022-09-06
Payer: MEDICAID

## 2022-09-06 VITALS
WEIGHT: 120 LBS | OXYGEN SATURATION: 98 % | HEART RATE: 80 BPM | DIASTOLIC BLOOD PRESSURE: 73 MMHG | HEIGHT: 64 IN | SYSTOLIC BLOOD PRESSURE: 112 MMHG | BODY MASS INDEX: 20.49 KG/M2

## 2022-09-06 DIAGNOSIS — N76.0 ACUTE VAGINITIS: Primary | ICD-10-CM

## 2022-09-06 DIAGNOSIS — Z01.419 PAPANICOLAOU SMEAR, AS PART OF ROUTINE GYNECOLOGICAL EXAMINATION: ICD-10-CM

## 2022-09-06 PROCEDURE — 99214 OFFICE O/P EST MOD 30 MIN: CPT | Performed by: INTERNAL MEDICINE

## 2022-09-06 PROCEDURE — 3078F DIAST BP <80 MM HG: CPT | Performed by: INTERNAL MEDICINE

## 2022-09-06 PROCEDURE — 3074F SYST BP LT 130 MM HG: CPT | Performed by: INTERNAL MEDICINE

## 2022-09-06 PROCEDURE — 3008F BODY MASS INDEX DOCD: CPT | Performed by: INTERNAL MEDICINE

## 2022-09-07 LAB — HPV I/H RISK 1 DNA SPEC QL NAA+PROBE: NEGATIVE

## 2022-09-08 LAB
GENITAL VAGINOSIS SCREEN: NEGATIVE
TRICHOMONAS SCREEN: NEGATIVE

## 2022-12-19 NOTE — TELEPHONE ENCOUNTER
Orders in place for MRA of BRAIN is always without contrast  ( I do not need MRA or extracranial vessels like Carotids in neck  - that needs with and without contrast)
RN will route to the provider to confirm if he would like the MRA with or with out contrast.
The MRI of the Spine has been approved she would like to have both at the same time. Can we put an order of MRA thanks.
1.83

## 2023-01-30 ENCOUNTER — NURSE TRIAGE (OUTPATIENT)
Dept: INTERNAL MEDICINE CLINIC | Facility: CLINIC | Age: 33
End: 2023-01-30

## 2023-01-30 ENCOUNTER — HOSPITAL ENCOUNTER (OUTPATIENT)
Dept: GENERAL RADIOLOGY | Age: 33
Discharge: HOME | End: 2023-01-30
Attending: INTERNAL MEDICINE
Payer: MEDICAID

## 2023-01-30 ENCOUNTER — OFFICE VISIT (OUTPATIENT)
Facility: CLINIC | Age: 33
End: 2023-01-30

## 2023-01-30 ENCOUNTER — EKG ENCOUNTER (OUTPATIENT)
Dept: LAB | Age: 33
End: 2023-01-30
Attending: INTERNAL MEDICINE
Payer: MEDICAID

## 2023-01-30 ENCOUNTER — LAB ENCOUNTER (OUTPATIENT)
Dept: LAB | Age: 33
End: 2023-01-30
Attending: INTERNAL MEDICINE
Payer: MEDICAID

## 2023-01-30 VITALS
HEART RATE: 89 BPM | HEIGHT: 64 IN | RESPIRATION RATE: 18 BRPM | OXYGEN SATURATION: 97 % | BODY MASS INDEX: 24.41 KG/M2 | WEIGHT: 143 LBS | DIASTOLIC BLOOD PRESSURE: 60 MMHG | SYSTOLIC BLOOD PRESSURE: 112 MMHG

## 2023-01-30 DIAGNOSIS — R00.2 PALPITATIONS: Primary | ICD-10-CM

## 2023-01-30 DIAGNOSIS — R00.2 PALPITATIONS: ICD-10-CM

## 2023-01-30 LAB
ATRIAL RATE: 72 BPM
BASOPHILS # BLD AUTO: 0.06 X10(3) UL (ref 0–0.2)
BASOPHILS NFR BLD AUTO: 0.7 %
DEPRECATED RDW RBC AUTO: 39.5 FL (ref 35.1–46.3)
EOSINOPHIL # BLD AUTO: 0.1 X10(3) UL (ref 0–0.7)
EOSINOPHIL NFR BLD AUTO: 1.1 %
ERYTHROCYTE [DISTWIDTH] IN BLOOD BY AUTOMATED COUNT: 12.3 % (ref 11–15)
HCT VFR BLD AUTO: 39.6 %
HGB BLD-MCNC: 13.3 G/DL
IMM GRANULOCYTES # BLD AUTO: 0.02 X10(3) UL (ref 0–1)
IMM GRANULOCYTES NFR BLD: 0.2 %
LYMPHOCYTES # BLD AUTO: 2.47 X10(3) UL (ref 1–4)
LYMPHOCYTES NFR BLD AUTO: 27.8 %
MCH RBC QN AUTO: 29.6 PG (ref 26–34)
MCHC RBC AUTO-ENTMCNC: 33.6 G/DL (ref 31–37)
MCV RBC AUTO: 88 FL
MONOCYTES # BLD AUTO: 0.75 X10(3) UL (ref 0.1–1)
MONOCYTES NFR BLD AUTO: 8.4 %
NEUTROPHILS # BLD AUTO: 5.48 X10 (3) UL (ref 1.5–7.7)
NEUTROPHILS # BLD AUTO: 5.48 X10(3) UL (ref 1.5–7.7)
NEUTROPHILS NFR BLD AUTO: 61.8 %
P AXIS: 25 DEGREES
P-R INTERVAL: 140 MS
PLATELET # BLD AUTO: 226 10(3)UL (ref 150–450)
Q-T INTERVAL: 372 MS
QRS DURATION: 84 MS
QTC CALCULATION (BEZET): 407 MS
R AXIS: 66 DEGREES
RBC # BLD AUTO: 4.5 X10(6)UL
T AXIS: 46 DEGREES
TSI SER-ACNC: 2.66 MIU/ML (ref 0.36–3.74)
VENTRICULAR RATE: 72 BPM
WBC # BLD AUTO: 8.9 X10(3) UL (ref 4–11)

## 2023-01-30 PROCEDURE — 93005 ELECTROCARDIOGRAM TRACING: CPT

## 2023-01-30 PROCEDURE — 93010 ELECTROCARDIOGRAM REPORT: CPT | Performed by: INTERNAL MEDICINE

## 2023-01-30 PROCEDURE — 3008F BODY MASS INDEX DOCD: CPT | Performed by: INTERNAL MEDICINE

## 2023-01-30 PROCEDURE — 99214 OFFICE O/P EST MOD 30 MIN: CPT | Performed by: INTERNAL MEDICINE

## 2023-01-30 PROCEDURE — 84443 ASSAY THYROID STIM HORMONE: CPT | Performed by: INTERNAL MEDICINE

## 2023-01-30 PROCEDURE — 36415 COLL VENOUS BLD VENIPUNCTURE: CPT | Performed by: INTERNAL MEDICINE

## 2023-01-30 PROCEDURE — 85025 COMPLETE CBC W/AUTO DIFF WBC: CPT | Performed by: INTERNAL MEDICINE

## 2023-01-30 PROCEDURE — 3074F SYST BP LT 130 MM HG: CPT | Performed by: INTERNAL MEDICINE

## 2023-01-30 PROCEDURE — 71046 X-RAY EXAM CHEST 2 VIEWS: CPT | Performed by: INTERNAL MEDICINE

## 2023-01-30 PROCEDURE — 3078F DIAST BP <80 MM HG: CPT | Performed by: INTERNAL MEDICINE

## 2023-02-08 ENCOUNTER — HOSPITAL ENCOUNTER (OUTPATIENT)
Dept: CV DIAGNOSTICS | Facility: HOSPITAL | Age: 33
Discharge: HOME | End: 2023-02-08
Attending: INTERNAL MEDICINE
Payer: MEDICAID

## 2023-02-08 DIAGNOSIS — R00.2 PALPITATIONS: ICD-10-CM

## 2023-02-08 PROCEDURE — 93243 EXT ECG>48HR<7D SCAN A/R: CPT | Performed by: INTERNAL MEDICINE

## 2023-02-08 PROCEDURE — 93242 EXT ECG>48HR<7D RECORDING: CPT | Performed by: INTERNAL MEDICINE

## 2023-02-10 ENCOUNTER — TELEPHONE (OUTPATIENT)
Dept: INTERNAL MEDICINE CLINIC | Facility: CLINIC | Age: 33
End: 2023-02-10

## 2023-02-10 NOTE — TELEPHONE ENCOUNTER
I received a staff message from the [facility] office that patient called with a question about her heart monitor. Please call the patient and ask her what her question is. Please give her the regular office number. This is the only number that she should call when trying to reach me.

## 2023-02-13 NOTE — TELEPHONE ENCOUNTER
Pt left vm in [facility]  Received: 3 days ago  Susana Dickinson RN  P Em Rn Triage; Live Mena MD  Pt left vm, question re heart monitor.    ph: [phone]

## 2023-02-21 ENCOUNTER — OFFICE VISIT (OUTPATIENT)
Facility: CLINIC | Age: 33
End: 2023-02-21

## 2023-02-21 VITALS
HEIGHT: 64 IN | DIASTOLIC BLOOD PRESSURE: 72 MMHG | HEART RATE: 79 BPM | WEIGHT: 136 LBS | OXYGEN SATURATION: 98 % | RESPIRATION RATE: 18 BRPM | BODY MASS INDEX: 23.22 KG/M2 | SYSTOLIC BLOOD PRESSURE: 100 MMHG

## 2023-02-21 DIAGNOSIS — L30.9 DERMATITIS: Primary | ICD-10-CM

## 2023-02-21 DIAGNOSIS — R00.2 PALPITATIONS: ICD-10-CM

## 2023-02-21 PROCEDURE — 3008F BODY MASS INDEX DOCD: CPT | Performed by: INTERNAL MEDICINE

## 2023-02-21 PROCEDURE — 3074F SYST BP LT 130 MM HG: CPT | Performed by: INTERNAL MEDICINE

## 2023-02-21 PROCEDURE — 3078F DIAST BP <80 MM HG: CPT | Performed by: INTERNAL MEDICINE

## 2023-02-21 PROCEDURE — 99213 OFFICE O/P EST LOW 20 MIN: CPT | Performed by: INTERNAL MEDICINE

## 2023-02-21 RX ORDER — TRIAMCINOLONE ACETONIDE 5 MG/G
1 OINTMENT TOPICAL 2 TIMES DAILY
Qty: 454 G | Refills: 0 | Status: SHIPPED | OUTPATIENT
Start: 2023-02-21

## 2023-12-14 ENCOUNTER — OFFICE VISIT (OUTPATIENT)
Dept: INTERNAL MEDICINE CLINIC | Facility: CLINIC | Age: 33
End: 2023-12-14

## 2023-12-14 VITALS
SYSTOLIC BLOOD PRESSURE: 116 MMHG | DIASTOLIC BLOOD PRESSURE: 77 MMHG | OXYGEN SATURATION: 98 % | WEIGHT: 154 LBS | BODY MASS INDEX: 26.29 KG/M2 | HEART RATE: 98 BPM | HEIGHT: 64 IN

## 2023-12-14 DIAGNOSIS — Z00.00 WELLNESS EXAMINATION: Primary | ICD-10-CM

## 2023-12-14 DIAGNOSIS — Z13.21 ENCOUNTER FOR VITAMIN DEFICIENCY SCREENING: ICD-10-CM

## 2023-12-14 DIAGNOSIS — G47.9 SLEEP DISTURBANCE: ICD-10-CM

## 2023-12-14 DIAGNOSIS — Z13.0 SCREENING FOR DEFICIENCY ANEMIA: ICD-10-CM

## 2023-12-14 DIAGNOSIS — R05.3 CHRONIC COUGH: ICD-10-CM

## 2023-12-14 DIAGNOSIS — Z13.220 LIPID SCREENING: ICD-10-CM

## 2023-12-14 DIAGNOSIS — Z13.29 THYROID DISORDER SCREEN: ICD-10-CM

## 2023-12-14 PROCEDURE — 99395 PREV VISIT EST AGE 18-39: CPT | Performed by: NURSE PRACTITIONER

## 2023-12-14 PROCEDURE — 3074F SYST BP LT 130 MM HG: CPT | Performed by: NURSE PRACTITIONER

## 2023-12-14 PROCEDURE — 3008F BODY MASS INDEX DOCD: CPT | Performed by: NURSE PRACTITIONER

## 2023-12-14 PROCEDURE — 99213 OFFICE O/P EST LOW 20 MIN: CPT | Performed by: NURSE PRACTITIONER

## 2023-12-14 PROCEDURE — 3078F DIAST BP <80 MM HG: CPT | Performed by: NURSE PRACTITIONER

## 2023-12-14 RX ORDER — ALBUTEROL SULFATE 90 UG/1
2 AEROSOL, METERED RESPIRATORY (INHALATION) EVERY 4 HOURS PRN
Qty: 18 G | Refills: 1 | Status: SHIPPED | OUTPATIENT
Start: 2023-12-14

## 2023-12-14 RX ORDER — MONTELUKAST SODIUM 10 MG/1
10 TABLET ORAL NIGHTLY
Qty: 90 TABLET | Refills: 0 | Status: SHIPPED | OUTPATIENT
Start: 2023-12-14

## 2023-12-15 ENCOUNTER — HOSPITAL ENCOUNTER (OUTPATIENT)
Dept: GENERAL RADIOLOGY | Age: 33
Discharge: HOME | End: 2023-12-15
Attending: NURSE PRACTITIONER
Payer: COMMERCIAL

## 2023-12-15 DIAGNOSIS — R05.3 CHRONIC COUGH: ICD-10-CM

## 2023-12-15 PROCEDURE — 71046 X-RAY EXAM CHEST 2 VIEWS: CPT | Performed by: NURSE PRACTITIONER

## 2024-01-14 ENCOUNTER — HOSPITAL ENCOUNTER (OUTPATIENT)
Age: 34
Discharge: HOME | End: 2024-01-14
Attending: EMERGENCY MEDICINE
Payer: COMMERCIAL

## 2024-01-14 VITALS
OXYGEN SATURATION: 98 % | DIASTOLIC BLOOD PRESSURE: 89 MMHG | SYSTOLIC BLOOD PRESSURE: 126 MMHG | TEMPERATURE: 99 F | HEART RATE: 93 BPM | RESPIRATION RATE: 20 BRPM

## 2024-01-14 DIAGNOSIS — J10.1 INFLUENZA A: Primary | ICD-10-CM

## 2024-01-14 LAB
POCT INFLUENZA A: POSITIVE
POCT INFLUENZA B: NEGATIVE
S PYO AG THROAT QL IA.RAPID: NEGATIVE
SARS-COV-2 RNA RESP QL NAA+PROBE: NOT DETECTED

## 2024-01-14 PROCEDURE — 99212 OFFICE O/P EST SF 10 MIN: CPT

## 2024-01-14 PROCEDURE — 87651 STREP A DNA AMP PROBE: CPT | Performed by: EMERGENCY MEDICINE

## 2024-01-14 PROCEDURE — 87502 INFLUENZA DNA AMP PROBE: CPT | Performed by: EMERGENCY MEDICINE

## 2024-01-14 PROCEDURE — 99213 OFFICE O/P EST LOW 20 MIN: CPT

## 2024-01-14 NOTE — ED PROVIDER NOTES
Patient Seen in: [facility] [place]      History     Chief Complaint   Patient presents with    Cough/URI     Stated Complaint: cough/uri    Subjective:   HPI    Patient is a 33-year-old female with no significant past medical history who presents now with cough, fever, sore throat.  The patient states the symptoms started on Thursday.  Patient describes initial sore throat associated with temperature as high as 102.  The patient has had headache and some nausea/vomiting.  Patient states she has a history of chronic cough secondary to smoking.  The patient states she had a recent chest x-ray which was unremarkable.    Objective:   Past Medical History:   Diagnosis Date    Acne     Anxiety     Depression               Past Surgical History:   Procedure Laterality Date    COLONOSCOPY N/A 1/21/2021    Procedure: COLONOSCOPY;  Surgeon: Tayler Johnson MD;  Location: [facility]                Social History     Socioeconomic History    Marital status: Single   Tobacco Use    Smoking status: Some Days     Types: Cigarettes    Smokeless tobacco: Never    Tobacco comments:     2 Cigarretes a day   Vaping Use    Vaping Use: Never used    Passive vaping exposure: Yes   Substance and Sexual Activity    Alcohol use: Yes     Alcohol/week: 3.0 standard drinks of alcohol     Types: 3 Standard drinks or equivalent per week     Comment: occasionnaly    Drug use: Yes    Sexual activity: Yes     Partners: Male     Birth control/protection: Condom   Other Topics Concern    Caffeine Concern Yes     Comment: 2 cups a week    Exercise Yes     Comment: 3 x week              Review of Systems    Positive for stated complaint: cough/uri  Other systems are as noted in HPI.  Constitutional and vital signs reviewed.      All other systems reviewed and negative except as noted above.    Physical Exam     ED Triage Vitals [01/14/24 1448]   /89   Pulse 93   Resp 20   Temp 98.6 °F (37 °C)   Temp src Temporal   SpO2 98 %   O2 Device None  (Room air)       Current:/89   Pulse 93   Temp 98.6 °F (37 °C) (Temporal)   Resp 20   LMP 01/14/2024 (Exact Date)   SpO2 98%         Physical Exam    Constitutional: Well-developed well-nourished in no acute distress  Head: Normocephalic, no swelling or tenderness  Eyes: Nonicteric sclera, no conjunctival injection  ENT: TMs are clear and flat bilaterally.  There is mild posterior pharyngeal erythema  Chest: Clear to auscultation, no tenderness  Cardiovascular: Regular rate and rhythm without murmur  Abdomen: Soft, nontender and nondistended  Neurologic: Patient is awake, alert and oriented ×3.  The patient's motor strength is 5 out of 5 and symmetric in the upper and lower extremities bilaterally  Extremities: No focal swelling or tenderness  Skin: No pallor, no redness or warmth to the touch      ED Course     Labs Reviewed   POCT FLU TEST - Abnormal; Notable for the following components:       Result Value    POCT INFLUENZA A Positive (*)     All other components within normal limits    Narrative:     This assay is a rapid molecular in vitro test utilizing nucleic acid amplification of influenza A and B viral RNA.   RAPID SARS-COV-2 BY PCR - Normal   RAPID STREP A - Normal             Pulse ox is 98% on room air, normal.  Vital signs are stable     The patient's negative strep, negative COVID, positive influenza A were reviewed with the patient.  The patient is outside of the first 48 hours of symptoms.  Motrin/Tylenol for any body aches or fever.    MDM      Viral URI versus influenza versus COVID                                   Medical Decision Making      Disposition and Plan     Clinical Impression:  1. Influenza A         Disposition:  Discharge  1/14/2024  3:17 pm    Follow-up:  Shanon Thompson MD  [address] [place] [id number] [phone]      As needed          Medications Prescribed:  Current Discharge Medication List

## 2024-01-20 ENCOUNTER — LAB ENCOUNTER (OUTPATIENT)
Dept: LAB | Age: 34
End: 2024-01-20
Attending: NURSE PRACTITIONER
Payer: COMMERCIAL

## 2024-01-20 LAB
ALBUMIN SERPL-MCNC: 4.4 G/DL (ref 3.2–4.8)
ALBUMIN/GLOB SERPL: 1.3 {RATIO} (ref 1–2)
ALP LIVER SERPL-CCNC: 59 U/L
ALT SERPL-CCNC: 10 U/L
ANION GAP SERPL CALC-SCNC: 7 MMOL/L (ref 0–18)
AST SERPL-CCNC: 17 U/L (ref ?–34)
BASOPHILS # BLD AUTO: 0.03 X10(3) UL (ref 0–0.2)
BASOPHILS NFR BLD AUTO: 0.5 %
BILIRUB SERPL-MCNC: 1.1 MG/DL (ref 0.3–1.2)
BUN BLD-MCNC: 8 MG/DL (ref 9–23)
BUN/CREAT SERPL: 12.1 (ref 10–20)
CALCIUM BLD-MCNC: 9.6 MG/DL (ref 8.7–10.4)
CHLORIDE SERPL-SCNC: 105 MMOL/L (ref 98–112)
CHOLEST SERPL-MCNC: 154 MG/DL (ref ?–200)
CO2 SERPL-SCNC: 27 MMOL/L (ref 21–32)
CREAT BLD-MCNC: 0.66 MG/DL
DEPRECATED RDW RBC AUTO: 36.1 FL (ref 35.1–46.3)
EGFRCR SERPLBLD CKD-EPI 2021: 119 ML/MIN/1.73M2 (ref 60–?)
EOSINOPHIL # BLD AUTO: 0.24 X10(3) UL (ref 0–0.7)
EOSINOPHIL NFR BLD AUTO: 4.4 %
ERYTHROCYTE [DISTWIDTH] IN BLOOD BY AUTOMATED COUNT: 11.9 % (ref 11–15)
FASTING PATIENT LIPID ANSWER: YES
FASTING STATUS PATIENT QL REPORTED: YES
GLOBULIN PLAS-MCNC: 3.3 G/DL (ref 2.8–4.4)
GLUCOSE BLD-MCNC: 95 MG/DL (ref 70–99)
HCT VFR BLD AUTO: 41.1 %
HDLC SERPL-MCNC: 63 MG/DL (ref 40–59)
HGB BLD-MCNC: 13.8 G/DL
IMM GRANULOCYTES # BLD AUTO: 0.01 X10(3) UL (ref 0–1)
IMM GRANULOCYTES NFR BLD: 0.2 %
LDLC SERPL CALC-MCNC: 79 MG/DL (ref ?–100)
LYMPHOCYTES # BLD AUTO: 2.29 X10(3) UL (ref 1–4)
LYMPHOCYTES NFR BLD AUTO: 41.8 %
MCH RBC QN AUTO: 27.9 PG (ref 26–34)
MCHC RBC AUTO-ENTMCNC: 33.6 G/DL (ref 31–37)
MCV RBC AUTO: 83.2 FL
MONOCYTES # BLD AUTO: 0.46 X10(3) UL (ref 0.1–1)
MONOCYTES NFR BLD AUTO: 8.4 %
NEUTROPHILS # BLD AUTO: 2.45 X10 (3) UL (ref 1.5–7.7)
NEUTROPHILS # BLD AUTO: 2.45 X10(3) UL (ref 1.5–7.7)
NEUTROPHILS NFR BLD AUTO: 44.7 %
NONHDLC SERPL-MCNC: 91 MG/DL (ref ?–130)
OSMOLALITY SERPL CALC.SUM OF ELEC: 286 MOSM/KG (ref 275–295)
PLATELET # BLD AUTO: 269 10(3)UL (ref 150–450)
POTASSIUM SERPL-SCNC: 3.7 MMOL/L (ref 3.5–5.1)
PROT SERPL-MCNC: 7.7 G/DL (ref 5.7–8.2)
RBC # BLD AUTO: 4.94 X10(6)UL
SODIUM SERPL-SCNC: 139 MMOL/L (ref 136–145)
TRIGL SERPL-MCNC: 57 MG/DL (ref 30–149)
TSI SER-ACNC: 1.84 MIU/ML (ref 0.55–4.78)
VIT D+METAB SERPL-MCNC: 16.7 NG/ML (ref 30–100)
VLDLC SERPL CALC-MCNC: 9 MG/DL (ref 0–30)
WBC # BLD AUTO: 5.5 X10(3) UL (ref 4–11)

## 2024-01-20 PROCEDURE — 84443 ASSAY THYROID STIM HORMONE: CPT | Performed by: NURSE PRACTITIONER

## 2024-01-20 PROCEDURE — 36415 COLL VENOUS BLD VENIPUNCTURE: CPT | Performed by: NURSE PRACTITIONER

## 2024-01-20 PROCEDURE — 85025 COMPLETE CBC W/AUTO DIFF WBC: CPT | Performed by: NURSE PRACTITIONER

## 2024-01-20 PROCEDURE — 80061 LIPID PANEL: CPT | Performed by: NURSE PRACTITIONER

## 2024-01-20 PROCEDURE — 82306 VITAMIN D 25 HYDROXY: CPT | Performed by: NURSE PRACTITIONER

## 2024-01-20 PROCEDURE — 80053 COMPREHEN METABOLIC PANEL: CPT | Performed by: NURSE PRACTITIONER

## 2024-02-13 ENCOUNTER — PATIENT MESSAGE (OUTPATIENT)
Dept: INTERNAL MEDICINE CLINIC | Facility: CLINIC | Age: 34
End: 2024-02-13

## 2024-02-14 NOTE — TELEPHONE ENCOUNTER
From: Basilio Patel  To: Era Soriano  Sent: 2/13/2024 4:28 PM CST  Subject: Doctor’s note    John Girard,     In one of my previous visits, I mentioned to you that I worked for [employer] and you said your cousin also worked for [employer] as well. I need to order new uniforms and I would like to order cotton because I sometimes get Tinea Versicolor on my torso and I get UTIs easily. I try to wear cotton to help with these problems. The uniforms we usually are allowed to order are polyester and we need a doctor’s note to order the cotton ones. Is it possible for you to send me a doctor’s note so I can order the cotton uniforms?    Sincerely,  Basilio Patel

## 2024-02-15 NOTE — TELEPHONE ENCOUNTER
Spoke with patient, (  Name and  verified ) informed of DANIELA Elena    instructions below    Informed to check her MyChart for the letter    Patient verbalizes understanding and agrees with plan.

## 2024-07-03 ENCOUNTER — OFFICE VISIT (OUTPATIENT)
Dept: INTERNAL MEDICINE CLINIC | Facility: CLINIC | Age: 34
End: 2024-07-03

## 2024-07-03 VITALS
HEART RATE: 96 BPM | WEIGHT: 155.19 LBS | HEIGHT: 64 IN | OXYGEN SATURATION: 98 % | DIASTOLIC BLOOD PRESSURE: 80 MMHG | RESPIRATION RATE: 18 BRPM | BODY MASS INDEX: 26.49 KG/M2 | SYSTOLIC BLOOD PRESSURE: 114 MMHG

## 2024-07-03 DIAGNOSIS — G47.00 FREQUENT NOCTURNAL AWAKENING: Primary | ICD-10-CM

## 2024-07-03 PROCEDURE — 99213 OFFICE O/P EST LOW 20 MIN: CPT | Performed by: INTERNAL MEDICINE

## 2024-07-03 NOTE — PROGRESS NOTES
Basilio Patel is a 34 year old female.  Chief Complaint   Patient presents with    Insomnia     HPI:    Patient presented today for follow up. She states that she has been struggling with sleep. She wakes up multiple times a night, because she feels like her body jolts her awake, and it happens while she is falling asleep. Was not able to sleep all night last night and had to take a day off today. Has had this problem for a long time. Attempted sleep study previousely but was not able to complete it. Melatonin gives her night anderson. No h/o seizure disorder, no loss of consciousness.    Was being worked up for MS few years ago and has not followed up with neuro in a while.     Current Outpatient Medications   Medication Sig Dispense Refill    albuterol 108 (90 Base) MCG/ACT Inhalation Aero Soln Inhale 2 puffs into the lungs every 4 (four) hours as needed for Wheezing or Shortness of Breath. (Patient not taking: Reported on 7/3/2024) 18 g 1    montelukast 10 MG Oral Tab Take 1 tablet (10 mg total) by mouth nightly. (Patient not taking: Reported on 7/3/2024) 90 tablet 0    triamcinolone 0.5 % External Ointment Apply 1 Application. topically 2 (two) times daily. (Patient not taking: Reported on 7/3/2024) 454 g 0    EPINEPHrine 0.3 MG/0.3ML Injection Solution Auto-injector Inject 0.3 mL (1 each total) into the muscle as needed. (Patient not taking: Reported on 7/3/2024) 1 each 0      Past Medical History:    Acne    Anxiety    Depression      Past Surgical History:   Procedure Laterality Date    Colonoscopy N/A 1/21/2021    Procedure: COLONOSCOPY;  Surgeon: Tayler Johnson MD;  Location: [facility]      Social History:  Social History     Socioeconomic History    Marital status: Single   Tobacco Use    Smoking status: Some Days     Types: Cigarettes    Smokeless tobacco: Never    Tobacco comments:     2 Cigarretes a day   Vaping Use    Vaping status: Never Used    Passive vaping exposure: Yes   Substance and Sexual  Activity    Alcohol use: Yes     Alcohol/week: 3.0 standard drinks of alcohol     Types: 3 Standard drinks or equivalent per week     Comment: occasionnaly    Drug use: Yes    Sexual activity: Yes     Partners: Male     Birth control/protection: Condom   Other Topics Concern    Caffeine Concern Yes     Comment: 2 cups a week    Exercise Yes     Comment: 3 x week      Family History   Problem Relation Age of Onset    Lipids Father         per Ng: hyperlipidemia    Allergies Mother     Lipids Mother         per NG: hyperlipidemia    Diabetes Other     Uterine Cancer Other     Cancer Other         per NG: pancreatic cancer      No Known Allergies     REVIEW OF SYSTEMS:   Review of Systems   Review of Systems   Constitutional: Negative for activity change, appetite change and fever.   HENT: Negative for congestion and voice change.    Respiratory: Negative for cough and shortness of breath.    Cardiovascular: Negative for chest pain.   Gastrointestinal: Negative for abdominal distention, abdominal pain and vomiting.   Genitourinary: Negative for hematuria.   Skin: Negative for wound.   Psychiatric/Behavioral: Negative for behavioral problems.   Wt Readings from Last 5 Encounters:   07/03/24 155 lb 3.2 oz (70.4 kg)   12/14/23 154 lb (69.9 kg)   02/21/23 136 lb (61.7 kg)   01/30/23 143 lb (64.9 kg)   09/06/22 120 lb (54.4 kg)     Body mass index is 26.64 kg/m².      EXAM:   /80 (BP Location: Left arm, Patient Position: Sitting, Cuff Size: large)   Pulse 96   Resp 18   Ht 5' 4\" (1.626 m)   Wt 155 lb 3.2 oz (70.4 kg)   LMP 06/22/2024 (Approximate)   SpO2 98%   BMI 26.64 kg/m²   Physical Exam   Constitutional:       Appearance: Normal appearance.   HENT:      Head: Normocephalic.   Eyes:      Conjunctiva/sclera: Conjunctivae normal.   Cardiovascular:      Rate and Rhythm: Normal rate and regular rhythm.      Heart sounds: Normal heart sounds. No murmur heard.  Pulmonary:      Effort: Pulmonary effort is normal.       Breath sounds: Normal breath sounds. No rhonchi or rales.   Abdominal:      General: Bowel sounds are normal.      Palpations: Abdomen is soft.      Tenderness: There is no abdominal tenderness.   Musculoskeletal:      Cervical back: Neck supple.      Right lower leg: No edema.      Left lower leg: No edema.   Skin:     General: Skin is warm and dry.   Neurological:      General: No focal deficit present.      Mental Status: He is alert and oriented to person, place, and time. Mental status is at baseline.   Psychiatric:         Mood and Affect: Mood normal.         Behavior: Behavior normal.       ASSESSMENT AND PLAN:   1. Frequent nocturnal awakening  - try benadryl as needed  - check labs again for metabolic issues  - res establish with neuro for follow up on MS  - Home Sleep Apnea Test (Adult pt only) - Sleep consult required for Medicare pts  - General sleep study; Future  - Comp Metabolic Panel (14)  - CBC W Differential W Platelet [E]  - TSH W Reflex To Free T4 [E]      The patient indicates understanding of these issues and agrees to the plan.      Carmen Godoy MD

## 2024-07-15 NOTE — TELEPHONE ENCOUNTER
Detail Level: Detailed Patient called, wondering if she should postpone her appointment until MRI results are in. Informed that next available appt for Dr Giovanni Weiss is in June.  Decided to keep appointment, was told that Dr will receive results and contact patient with any changes

## 2024-07-31 ENCOUNTER — HOSPITAL ENCOUNTER (OUTPATIENT)
Age: 34
Discharge: HOME | End: 2024-07-31
Payer: COMMERCIAL

## 2024-07-31 VITALS
HEART RATE: 109 BPM | DIASTOLIC BLOOD PRESSURE: 82 MMHG | TEMPERATURE: 99 F | OXYGEN SATURATION: 100 % | SYSTOLIC BLOOD PRESSURE: 129 MMHG | RESPIRATION RATE: 18 BRPM

## 2024-07-31 DIAGNOSIS — B34.9 VIRAL SYNDROME: Primary | ICD-10-CM

## 2024-07-31 LAB
POCT INFLUENZA A: NEGATIVE
POCT INFLUENZA B: NEGATIVE
S PYO AG THROAT QL IA.RAPID: NEGATIVE
SARS-COV-2 RNA RESP QL NAA+PROBE: NOT DETECTED

## 2024-07-31 PROCEDURE — 87502 INFLUENZA DNA AMP PROBE: CPT | Performed by: NURSE PRACTITIONER

## 2024-07-31 PROCEDURE — 99212 OFFICE O/P EST SF 10 MIN: CPT

## 2024-07-31 PROCEDURE — 87651 STREP A DNA AMP PROBE: CPT | Performed by: NURSE PRACTITIONER

## 2024-07-31 PROCEDURE — 99213 OFFICE O/P EST LOW 20 MIN: CPT

## 2024-07-31 RX ORDER — ACETAMINOPHEN 500 MG
1000 TABLET ORAL ONCE
Status: DISCONTINUED | OUTPATIENT
Start: 2024-07-31 | End: 2024-07-31

## 2024-07-31 NOTE — ED PROVIDER NOTES
Patient Seen in: [facility] [place]      History     Chief Complaint   Patient presents with    Sore Throat    Cough/URI    Nasal Congestion     Stated Complaint: cold    Subjective:   HPI    34 yr old female here for evaluation of cough, congestion, sore throat, HA, feeling short of breath when lying down for sleep x 3 days. Symptoms started Monday. 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 She denies recent travel. She is not having abd pain, vomiting, diarrhea, loss of appetite, dizziness, chest pain or shortness of breath. She has been taking Nyquil and cold and sinus during the day. She took at covid test at home but it was  so would like to make sure its not this.    Objective:   No pertinent past medical history.            No pertinent past surgical history.              No pertinent social history.            Review of Systems    Positive for stated Chief Complaint: Sore Throat, Cough/URI, and Nasal Congestion    Other systems are as noted in HPI.  Constitutional and vital signs reviewed.      All other systems reviewed and negative except as noted above.    Physical Exam     ED Triage Vitals [24]   /82   Pulse 109   Resp 18   Temp 99.2 °F (37.3 °C)   Temp src Temporal   SpO2 100 %   O2 Device None (Room air)       Current Vitals:   Vital Signs  BP: 129/82  Pulse: 109  Resp: 18  Temp: 99.2 °F (37.3 °C)  Temp src: Temporal    Oxygen Therapy  SpO2: 100 %  O2 Device: None (Room air)            Physical Exam  Vitals and nursing note reviewed.   Constitutional:       General: She is not in acute distress.     Appearance: She is well-developed. She is ill-appearing (fatigued). She is not toxic-appearing or diaphoretic.   HENT:      Head: Normocephalic.      Right Ear: Ear canal normal. A middle ear effusion is present.      Left Ear: Ear canal normal. A middle ear effusion is present.      Nose: Congestion and rhinorrhea present.      Mouth/Throat:      Mouth:  Mucous membranes are moist. No oral lesions.      Pharynx: Oropharynx is clear. Uvula midline. No pharyngeal swelling, oropharyngeal exudate, posterior oropharyngeal erythema or uvula swelling.      Tonsils: No tonsillar exudate or tonsillar abscesses.   Eyes:      Pupils: Pupils are equal, round, and reactive to light.   Cardiovascular:      Rate and Rhythm: Tachycardia present.   Pulmonary:      Effort: Pulmonary effort is normal. No respiratory distress.      Breath sounds: No stridor. No wheezing, rhonchi or rales.   Chest:      Chest wall: No tenderness.   Abdominal:      General: There is no distension.      Palpations: Abdomen is soft.      Tenderness: There is no abdominal tenderness. There is no guarding.   Musculoskeletal:      Cervical back: Normal range of motion.   Lymphadenopathy:      Cervical: No cervical adenopathy.   Skin:     General: Skin is warm.   Neurological:      Mental Status: She is alert and oriented to person, place, and time.   Psychiatric:         Mood and Affect: Mood normal.         Behavior: Behavior normal.               ED Course     Labs Reviewed   RAPID STREP A - Normal   RAPID SARS-COV-2 BY PCR - Normal   POCT FLU TEST - Normal    Narrative:     This assay is a rapid molecular in vitro test utilizing nucleic acid amplification of influenza A and B viral RNA.          ED Course as of 08/01/24 1131  ------------------------------------------------------------  Time: 07/31 1854  Value: Rapid Strep A - ID NOW  Comment: (Reviewed)  ------------------------------------------------------------  Time: 07/31 1854  Value: POCT Flu Test  Comment: (Reviewed)  ------------------------------------------------------------  Time: 07/31 1858  Value: Rapid SARS-CoV-2 by PCR  Comment: (Reviewed)              MDM     34 yr old female here for evaluation of congestion cough HA and sore throat since Monday. [place].    ON exam, pt fatigued appearing ++congested. Lungs clear with no wheezing  or crackles. BL TM with effusion no erythema or bulging. Canals normal. Skin warm and dry. Pharynx with no erythema or swelling. Tongue moist.    Differential diagnoses reflecting the complexity of care include but are not limited to COVID< FLU, strep, other viral syndrome, sinusitis.    Comorbidities that add complexity to management include: none  History obtained by an independent source was from: patient  My independent interpretations of studies include: Strep NEG  COVID NEG  FLU NEG  Shared decision making was done by: patient myself  Discussions of management was done with: patient    Patient is fatigued, non-toxic and in no acute distress.  Vital signs are stable.    with low grad fever.   Ordered Tylenol, pt declined as shed like to take Myquil when she gets home.    Discussed PO fluids, hydration importance and rest. Discussed repeating at home covid test tomorrow or next day due to symptoms and contact with public as covid numbers have been high    Discussed sudafed, flonase, warm teas throat lozenges, salt gargles, ibuprofen/tylenol.    All questions answered. Return and ER precautions given.    Counseled: Patient, regarding diagnosis, regarding treatment plan, regarding diagnostic results, regarding prescription, I have discussed with the patient the results of tests, differential diagnosis, and warning signs and symptoms that should prompt immediate return. The patient understands these instructions and agrees to the follow-up plan provided. There is no barriers to learning. Appropriate f/u given. Patient agrees to return for any concerns/ problems/complications.                                     Medical Decision Making      Disposition and Plan     Clinical Impression:  1. Viral syndrome         Disposition:  Discharge  7/31/2024  7:00 pm    Follow-up:  Shanon Thompson MD  [address] [place] [address] [phone]    Schedule an appointment as soon as possible for a visit in 3 days  If  symptoms worsen          Medications Prescribed:  Discharge Medication List as of 7/31/2024  7:04 PM

## 2024-07-31 NOTE — ED INITIAL ASSESSMENT (HPI)
Patient with congestion, sore throat and some dyspnea at night  [place]

## 2024-07-31 NOTE — DISCHARGE INSTRUCTIONS
Viral infections are usually the worst days 3-5, but can last up to 14 days.  You may develop or continue to cough for up to 3 weeks after.  Viral infections do not improve with the use of antibiotics.  Nelson diet, increase water intake; gatorade or pedialyte   Runny Nose/Congestion:  Humidifier at bedside   Vicks vaporub under nose and chest wall  - Nasal Rinse (Pride Pot)  - Flonase nasal spray to each nostril once daily  - Antihistamine (Allegra, Zyrtec, Claritin)  - Nasal Decongestant (Sudafed); if you have high blood pressure max use 2 days  Cough:  - Mucinex OR Robitussin  - Warm tea w/honey  - Humidifier in bedroom at night  Sore Throat:  - Salt water gargle  -Warm tea w honey  - Ibuprofen every 6-8 hours as needed for pain  Fever:  Tylenol or Motrin every 6 hours for fever > 100.4. You can alternate between the two as well if fever high.  Follow up with your primary care provider in the next 2-3 days.  Go to the emergency room with:  - Fever > 102 that does not respond to medications.  - Shortness of breath, difficulty breathing.  - Chest pain.  - Vomiting and unable to keep down fluids or medications

## 2024-08-13 NOTE — TELEPHONE ENCOUNTER
"ADMIT NOTE  =================  39w5d    Heike Musa is a 35 year old female with an Patient's last menstrual period was 2023. and Estimated Date of Delivery: Aug 14, 2024 [facility].     HPI  ===============  Pt was seen at [facility] earlier today (~1000) for prenatal visit and had elevated blood pressures x 2 (152/88 and 149/90).      Pregnancy is complicated by breech positioning and GDMA1.      Contractions- mild  Fetal movement- active  ROM- no   Vaginal bleeding- none  GBS- negative  FOB- is involved, Olvin  Other labor support- none    Weight gain- 182 - 164 lbs, Total weight gain- 18 lbs  Height- 64 in    First prenatal visit at 10 weeks, Total visits- 13    PROBLEM LIST  =================  Patient Active Problem List    Diagnosis Date Noted    Elevated blood pressure complicating pregnancy in third trimester, antepartum 2024     Priority: Medium     Vitals:     24 1009 24 1011   BP: (!) 152/88 (!) 149/90   Pulse: 57 58   Resp: 20 20   SpO2: 99% 99%   Weight: 182 lb 5.1 oz (82.7 kg)     Height: 5' 4\" (1.626 m)          Encounter for triage in pregnant patient 2024     Priority: Medium    Breech presentation, no version 2024     Priority: Medium    Flatulence, eructation and gas pain 2020     Priority: Medium     (normal spontaneous vaginal delivery) 2020     Priority: Medium     Anterior and medial episiotomy with extension to 3 degree partial 3a      Labor and delivery, indication for care 2020     Priority: Medium    Diet controlled gestational diabetes mellitus (GDM) in second trimester 2020     Priority: Medium     1 hour GCT 20  143,   20 3 HOUR :  88/(199)/(216)/(171)  Abnormal 3 out of 4.  20 Diabetic ed.      Encounter for supervision of normal first pregnancy in second trimester 2020     Priority: Medium     20 [facility] U/S;  20 weeks, PAL 20, nl fetal survey, placenta " Spoke to patient she has appointment on Monday, states her concern or not age urgent care she can wait until next Monday, symptoms present for a while. posterior and low lying repeat in 6-8 weeks  3/9/20 MFM U/S; 37 0/7weeks posterior placenta no longer low lying, male, 49%tile growth, 2 lbs 3 oz every 4 week growths due to GDM  20 Growth:  Cephalic fetus.  EFW 6# 9oz, 2966 gm, 74th growth percentile.  MVP 6.5.           Need for Tdap vaccination 12/10/2019     Priority: Medium     declined      Immunization counseling 2019     Priority: Medium     Immune to Hep A, hep B, and measles      Vertiginous migraine 2018     Priority: Medium     HISTORIES  ============  No Known Allergies  Past Medical History:   Diagnosis Date    Migraines      No past surgical history on file..  Family History   Problem Relation Age of Onset    Hypertension Mother      Social History     Tobacco Use    Smoking status: Never    Smokeless tobacco: Never   Substance Use Topics    Alcohol use: No     OB History    Para Term  AB Living   2 1 1 0 0 1   SAB IAB Ectopic Multiple Live Births   0 0 0 0 1      # Outcome Date GA Lbr Nicolás/2nd Weight Sex Type Anes PTL Lv   2 Current            1 Term 20 40w0d 05:55 / 02:08 3.16 kg (6 lb 15.5 oz) M Vag-Spont EPI N CRISTAL      Name: JERRY HUERTA      Apgar1: 8  Apgar5: 9        LABS:   ===========  Prenatal Labs:  Rhogam not indicated   Lab Results   Component Value Date    ABO O 2020    RH Pos 2020    AS Negative 2024    HEPBANG Nonreactive 2024    HGB 12.5 2024     Rubella positive  Lab Results   Component Value Date    GBS Negative 2020     Other labs:  Results for orders placed or performed during the hospital encounter of 24 (from the past 24 hour(s))   ABO/Rh type and screen    Narrative    The following orders were created for panel order ABO/Rh type and screen.  Procedure                               Abnormality         Status                     ---------                               -----------         ------                     Adult Type and Screen[[id number]]                             Final result                 Please view results for these tests on the individual orders.   CBC with platelets   Result Value Ref Range    WBC Count 8.5 4.0 - 11.0 10e3/uL    RBC Count 4.01 3.80 - 5.20 10e6/uL    Hemoglobin 12.5 11.7 - 15.7 g/dL    Hematocrit 35.5 35.0 - 47.0 %    MCV 89 78 - 100 fL    MCH 31.2 26.5 - 33.0 pg    MCHC 35.2 31.5 - 36.5 g/dL    RDW 13.3 10.0 - 15.0 %    Platelet Count 137 (L) 150 - 450 10e3/uL   Comprehensive metabolic panel   Result Value Ref Range    Sodium 136 135 - 145 mmol/L    Potassium 4.1 3.4 - 5.3 mmol/L    Carbon Dioxide (CO2) 18 (L) 22 - 29 mmol/L    Anion Gap 14 7 - 15 mmol/L    Urea Nitrogen 8.6 6.0 - 20.0 mg/dL    Creatinine 0.52 0.51 - 0.95 mg/dL    GFR Estimate >90 >60 mL/min/1.73m2    Calcium 9.3 8.8 - 10.4 mg/dL    Chloride 104 98 - 107 mmol/L    Glucose 70 70 - 99 mg/dL    Alkaline Phosphatase 208 (H) 40 - 150 U/L    AST 16 0 - 45 U/L    ALT 6 0 - 50 U/L    Protein Total 6.5 6.4 - 8.3 g/dL    Albumin 3.4 (L) 3.5 - 5.2 g/dL    Bilirubin Total 0.6 <=1.2 mg/dL   Protein  random urine   Result Value Ref Range    Total Protein Urine mg/dL 12.7   mg/dL    Total Protein Urine mg/mg Creat 0.38 (H) 0.00 - 0.20 mg/mg Cr    Creatinine Urine mg/dL 33.4 mg/dL   Adult Type and Screen   Result Value Ref Range    ABO/RH(D) O POS     Antibody Screen Negative Negative    SPECIMEN EXPIRATION DATE [id number]    Glucose by meter   Result Value Ref Range    GLUCOSE BY METER POCT 72 70 - 99 mg/dL       ROS  =========  Pt denies significant respiratory, cardiovacular, GI, or muscular/skeletalcomplaints.    See RN data base ROS.     PHYSICAL EXAM:  ===============  Patient Vitals for the past 24 hrs:   BP Temp Temp src Resp Height Weight   08/12/24 2126 (!) 149/72 98  F (36.7  C) Oral 16 -- --   08/12/24 2055 (!) 151/74 -- -- -- -- --   08/12/24 2024 132/62 -- -- -- -- --   08/12/24 1954 (!) 143/70 -- -- -- -- --   08/12/24 1908 (!) 150/73 98  F (36.7  C) Oral 15  "-- --   24 1900 (!) 154/69 -- -- -- -- --   24 1840 (!) 168/85 -- -- -- -- --   24 1811 (!) 156/77 -- -- -- -- --   24 1756 (!) 165/81 -- -- -- -- --   24 1748 -- -- -- -- 1.6 m (5' 3\") 82 kg (180 lb 12.4 oz)   24 1740 (!) 148/81 98.1  F (36.7  C) Oral 18 -- --     BP (!) 149/72   Temp 98  F (36.7  C) (Oral)   Resp 16   Ht 1.6 m (5' 3\")   Wt 82 kg (180 lb 12.4 oz)   LMP 2023   BMI 32.02 kg/m    General appearance: some discomfort noted with contractions  GENERAL APPEARANCE: healthy, alert and no distress  RESP: normal respiratory effort  CV: regular rates and rhythm  ABDOMEN:  soft, nontender, no epigastric pain  SKIN: no suspicious lesions or rashes  NEURO: Denies headache, blurred vision, other vision changes  PSYCH: mentation appears normal. and affect normal/bright  Legs: Reflexes normal bilaterally     Abdomen: gravid, vertex fetus per Leopold's, non-tender between contractions.   Joseph breech presentation confirmed by [facility]- by Dr. Farshad MAHONEY-  8.5 lbs.   CONTRACTIONS: every 3-11 minutes  FETAL HEART TONES: continuous EFM- baseline 130 with moderate variability and positive accelerations. No decelerations.  PELVIC EXAM: 1 cm per RN    BLOODY SHOW: no   ROM:no  FLUID: none  AMNISURE: not done    ASSESSMENT:  ==============  IUP @ 39w5d admitted with pre-eclampsia   NST NOT DONE  Fetal Heart Rate - category one  GBS- negative  GDMA1    Patient Active Problem List   Diagnosis    Vertiginous migraine    Immunization counseling    Need for Tdap vaccination    Encounter for supervision of normal first pregnancy in second trimester    Diet controlled gestational diabetes mellitus (GDM) in second trimester    Labor and delivery, indication for care    Flatulence, eructation and gas pain     (normal spontaneous vaginal delivery)    Breech presentation, no version    Elevated blood pressure complicating pregnancy in third trimester, antepartum    Encounter for triage in " pregnant patient     PLAN:  ===========  Admit - see IP orders  In-depth discussion re: recommendation for  d/t breech position, consistent contractions and new diagnosis of pre-eclampsia.  Discussed risks > benefits at this time of prolonging pregnancy.  Discussed very unlikely that baby would turn position on its own at this time. Pt was given time to discuss with partner and support system.    Patient had severe range BPs > 4 hours apart, meeting criteria for pre-eclampsia with severe features. Discussed risks of severe range BP.  Transfer care to MD service.  Pt agreeable to .  Prepare for     SUSAN Gonzalez CNM

## 2024-08-16 ENCOUNTER — OFFICE VISIT (OUTPATIENT)
Dept: SLEEP CENTER | Age: 34
End: 2024-08-16
Attending: INTERNAL MEDICINE
Payer: COMMERCIAL

## 2024-08-16 DIAGNOSIS — G47.00 FREQUENT NOCTURNAL AWAKENING: ICD-10-CM

## 2024-08-16 PROCEDURE — 95806 SLEEP STUDY UNATT&RESP EFFT: CPT

## 2024-09-25 ENCOUNTER — OFFICE VISIT (OUTPATIENT)
Dept: INTERNAL MEDICINE CLINIC | Facility: CLINIC | Age: 34
End: 2024-09-25

## 2024-09-25 VITALS
HEART RATE: 90 BPM | HEIGHT: 64 IN | BODY MASS INDEX: 26.8 KG/M2 | WEIGHT: 157 LBS | DIASTOLIC BLOOD PRESSURE: 79 MMHG | SYSTOLIC BLOOD PRESSURE: 113 MMHG

## 2024-09-25 DIAGNOSIS — F32.A DEPRESSION, UNSPECIFIED DEPRESSION TYPE: Primary | ICD-10-CM

## 2024-09-25 DIAGNOSIS — F51.01 PRIMARY INSOMNIA: ICD-10-CM

## 2024-09-25 PROCEDURE — 99213 OFFICE O/P EST LOW 20 MIN: CPT | Performed by: INTERNAL MEDICINE

## 2024-09-25 RX ORDER — TRAZODONE HYDROCHLORIDE 50 MG/1
50 TABLET, FILM COATED ORAL NIGHTLY
Qty: 30 TABLET | Refills: 1 | Status: SHIPPED | OUTPATIENT
Start: 2024-09-25

## 2024-09-25 NOTE — PROGRESS NOTES
Basilio Patel is a 34 year old female.  Chief Complaint   Patient presents with    Sleep Problem     Has slept 2 hours everynight for the past 4 days, took  (use by 2021) trazodone 50mg last night (previoiusly prescribed by Dr. Thompson)      HPI:    Patient presented today for follow up. She states that for last three days her sleep has been worst. She has not slept in three nights. She has always had sleep issues but for last few days because of stress with her relationship she has been struggling more. She was not able to go to work either because of sleep deprivation. She tried to look for benadryl but did not have it therefore took trazodone which was prescribed to her 4 years ago. She used to be on trazodone and cymbalta years ago but has been off meds for a while.    She is stressed because of some issues with her relationship with the boyfriend and currently they are seeking couples therapy.    Denies suicidal ideation     Current Outpatient Medications   Medication Sig Dispense Refill    traZODone 50 MG Oral Tab Take 1 tablet (50 mg total) by mouth nightly. 30 tablet 1    albuterol 108 (90 Base) MCG/ACT Inhalation Aero Soln Inhale 2 puffs into the lungs every 4 (four) hours as needed for Wheezing or Shortness of Breath. (Patient not taking: Reported on 7/3/2024) 18 g 1    montelukast 10 MG Oral Tab Take 1 tablet (10 mg total) by mouth nightly. (Patient not taking: Reported on 7/3/2024) 90 tablet 0    triamcinolone 0.5 % External Ointment Apply 1 Application. topically 2 (two) times daily. (Patient not taking: Reported on 7/3/2024) 454 g 0    EPINEPHrine 0.3 MG/0.3ML Injection Solution Auto-injector Inject 0.3 mL (1 each total) into the muscle as needed. (Patient not taking: Reported on 7/3/2024) 1 each 0      Past Medical History:    Acne    Anxiety    Depression      Past Surgical History:   Procedure Laterality Date    Colonoscopy N/A 2021    Procedure: COLONOSCOPY;  Surgeon: Tayler Johnson,  MD;  Location: [facility]      Social History:  Social History     Socioeconomic History    Marital status: Single   Tobacco Use    Smoking status: Some Days     Types: Cigarettes    Smokeless tobacco: Never    Tobacco comments:     2 Cigarretes a day   Vaping Use    Vaping status: Never Used    Passive vaping exposure: Yes   Substance and Sexual Activity    Alcohol use: Yes     Alcohol/week: 3.0 standard drinks of alcohol     Types: 3 Standard drinks or equivalent per week     Comment: occasionnaly    Drug use: Yes    Sexual activity: Yes     Partners: Male     Birth control/protection: Condom   Other Topics Concern    Caffeine Concern Yes     Comment: 2 cups a week    Exercise Yes     Comment: 3 x week      Family History   Problem Relation Age of Onset    Lipids Father         per Ng: hyperlipidemia    Allergies Mother     Lipids Mother         per NG: hyperlipidemia    Diabetes Other     Uterine Cancer Other     Cancer Other         per NG: pancreatic cancer      No Known Allergies     REVIEW OF SYSTEMS:   Review of Systems   Review of Systems   Constitutional: Negative for activity change, appetite change and fever.   HENT: Negative for congestion and voice change.    Respiratory: Negative for cough and shortness of breath.    Cardiovascular: Negative for chest pain.   Gastrointestinal: Negative for abdominal distention, abdominal pain and vomiting.   Genitourinary: Negative for hematuria.   Skin: Negative for wound.   Psychiatric/Behavioral: Negative for behavioral problems.   Wt Readings from Last 5 Encounters:   09/25/24 157 lb (71.2 kg)   07/03/24 155 lb 3.2 oz (70.4 kg)   12/14/23 154 lb (69.9 kg)   02/21/23 136 lb (61.7 kg)   01/30/23 143 lb (64.9 kg)     Body mass index is 26.95 kg/m².      EXAM:   /79   Pulse 90   Ht 5' 4\" (1.626 m)   Wt 157 lb (71.2 kg)   LMP 09/12/2024 (Approximate)   BMI 26.95 kg/m²   Physical Exam   Constitutional:       Appearance: Normal appearance.   HENT:      Head:  Normocephalic.   Eyes:      Conjunctiva/sclera: Conjunctivae normal.   Cardiovascular:      Rate and Rhythm: Normal rate and regular rhythm.      Heart sounds: Normal heart sounds. No murmur heard.  Pulmonary:      Effort: Pulmonary effort is normal.      Breath sounds: Normal breath sounds. No rhonchi or rales.   Abdominal:      General: Bowel sounds are normal.      Palpations: Abdomen is soft.      Tenderness: There is no abdominal tenderness.   Musculoskeletal:      Cervical back: Neck supple.      Right lower leg: No edema.      Left lower leg: No edema.   Skin:     General: Skin is warm and dry.   Neurological:      General: No focal deficit present.      Mental Status: He is alert and oriented to person, place, and time. Mental status is at baseline.   Psychiatric:         Mood and Affect: Mood normal.         Behavior: Behavior normal.       ASSESSMENT AND PLAN:   1. Depression, unspecified depression type  - continue with therapy  - restart trazodone  - traZODone 50 MG Oral Tab; Take 1 tablet (50 mg total) by mouth nightly.  Dispense: 30 tablet; Refill: 1  - [facility] Referral - In Network    2. Primary insomnia  - traZODone 50 MG Oral Tab; Take 1 tablet (50 mg total) by mouth nightly.  Dispense: 30 tablet; Refill: 1          The patient indicates understanding of these issues and agrees to the plan.      Carmen Godoy MD

## 2024-10-02 ENCOUNTER — TELEPHONE (OUTPATIENT)
Age: 34
End: 2024-10-02

## 2024-10-02 NOTE — TELEPHONE ENCOUNTER
Hello,  Sorry I missed you - I am reaching out from the [facility] Navigation department, following up on an order from your provider's office to assist in connecting you with resources for care. If you would like to discuss this further, please give us a call back at [phone], or for more immediate assistance you can contact our 24-hour help line at [phone]. We look forward to hearing from you soon.

## 2024-10-14 ENCOUNTER — TELEPHONE (OUTPATIENT)
Age: 34
End: 2024-10-14

## 2024-10-14 NOTE — TELEPHONE ENCOUNTER
Hello,     The [facility] Navigation team has attempted to reach you regarding an order from Dr. Godoy's office. We are reaching out in order to assist you in coordinating care and resources that may meet your needs. Please give our office a call at [phone]. For more immediate assistance you can contact our 24-hour help line at [phone]. We look forward to hearing from you soon.

## 2024-10-17 ENCOUNTER — TELEPHONE (OUTPATIENT)
Dept: INTERNAL MEDICINE CLINIC | Facility: CLINIC | Age: 34
End: 2024-10-17

## 2024-10-17 NOTE — TELEPHONE ENCOUNTER
traZODone 50 MG Oral Tab, Take 1 tablet (50 mg total) by mouth nightly., Disp: 30 tablet, Rfl: 1

## 2025-01-31 ENCOUNTER — NURSE TRIAGE (OUTPATIENT)
Dept: INTERNAL MEDICINE CLINIC | Facility: CLINIC | Age: 35
End: 2025-01-31

## 2025-01-31 NOTE — TELEPHONE ENCOUNTER
Action Requested: Summary for Provider     []  Critical Lab, Recommendations Needed  [] Need Additional Advice  []   FYI    []   Need Orders  [] Need Medications Sent to Pharmacy  []  Other     SUMMARY: patient advised IC/ER for dizziness.     Per patient upper resp x 2 weeks ago and is not sure if related but thinks she may have vertigo.     Patient reports dizziness over the last several days and also reports 2 episodes of chest discomfort which lasted a few seconds each.  Patient also reports sometimes when dizziness occurs she needs to hold on to something so she won't fall over.    Per protocol advised IC/ER    Reason for call: Acute  Onset: Data Unavailable                     Reason for Disposition   SEVERE dizziness (e.g., unable to stand, requires support to walk, feels like passing out now)    Protocols used: Dizziness-A-[place]

## (undated) DEVICE — 3 ML SYRINGE LUER-LOCK TIP: Brand: MONOJECT

## (undated) DEVICE — MEDI-VAC NON-CONDUCTIVE SUCTION TUBING 6MM X 1.8M (6FT.) L: Brand: CARDINAL HEALTH

## (undated) DEVICE — 6 ML SYRINGE LUER-LOCK TIP: Brand: MONOJECT

## (undated) DEVICE — FORCEP RADIAL JAW 4

## (undated) DEVICE — LINE MNTR ADLT SET O2 INTMD

## (undated) DEVICE — 35 ML SYRINGE REGULAR TIP: Brand: MONOJECT

## (undated) DEVICE — STERILE LATEX POWDER-FREE SURGICAL GLOVESWITH NITRILE COATING: Brand: PROTEXIS

## (undated) DEVICE — CONMED SCOPE SAVER BITE BLOCK, 20X27 MM: Brand: SCOPE SAVER

## (undated) DEVICE — SNARE CAPTI HEX STIFF MEDIUM

## (undated) DEVICE — Device: Brand: DEFENDO AIR/WATER/SUCTION AND BIOPSY VALVE

## (undated) DEVICE — Device: Brand: CUSTOM PROCEDURE KIT

## (undated) NOTE — LETTER
2/14/2024          To Whom It May Concern:    Basilio Patel is currently under my medical care and is recommended to order/wear cotton uniforms.     If you require additional information please contact our office.        Sincerely,    DANIELA France          Document generated by:  Ashlee WAGNER RN

## (undated) NOTE — LETTER
Date & Time: 1/14/2024, 3:16 PM  Patient: Basilio Patel  Encounter Provider(s):    Brian Alcantar MD       To Whom It May Concern:    Basilio Patel was seen and treated in our department on 1/14/2024. She should not return to work until she has not had a temperature for at least 24 hours.  The patient should be excused from work for the days that she missed at the end of last week.  Thank you for your assistance .    If you have any questions or concerns, please do not hesitate to call.        _____________________________  Physician/APC Signature

## (undated) NOTE — LETTER
[address], [place]  Authorization for Invasive Procedures  1. I hereby authorize Dr. Sejal Villasenor.  DANIA , my physician and whomever may be designated as the doctor's assistant, to perform the following operation and/or procedure:  L performed for the purposes of advancing medicine, science, and/or education, provided my identity is not revealed. If the procedure has been videotaped, the physician/surgeon will obtain the original videotape.  The hospital will not be responsible for stor My signature below affirms that prior to the time of the procedure, I have explained to the patient and/or her legal representative, the risks and benefits involved in the proposed treatment and any reasonable alternative to the proposed treatment.  I have

## (undated) NOTE — LETTER
9/25/2024          To Whom It May Concern:    Basilio Patel is currently under my medical care and may not return to work at this time.    Please excuse Basilio for 3 days from 09/233/2024 to 09/25/2024.  She may return to work on 09/26/2024.  Activity is restricted as follows: none.    If you require additional information please contact our office.        Sincerely,    Carmen Godoy MD           Document generated by:  Carmen Godoy MD

## (undated) NOTE — LETTER
7/3/2024          To Whom It May Concern:    Basilio Patel is currently under my medical care and may not return to work at this time.    Please excuse Basilio for 1 days.  She may return to work on 07/5/2024.  Activity is restricted as follows: none.    If you require additional information please contact our office.        Sincerely,    Carmen Godoy MD           Document generated by:  Carmen Godoy MD

## (undated) NOTE — LETTER
9/25/2024          To Whom It May Concern:    Basilio Patel is currently under my medical care and may not return to work at this time.    Please excuse Basilio for 3 days.  She may return to work on 09/26/2024.  Activity is restricted as follows: none.    If you require additional information please contact our office.        Sincerely,    Carmen Godoy MD           Document generated by:  Carmen Godoy MD

## (undated) NOTE — LETTER
2021    Regarding: Perry Vazquez,  1990  In response to denial of MRI brain, CPT 19245  Case # [id number]    To Whom it May Concern:    I am writing to appeal the denial of MRI brain, without contrast (CPT 51297) for Ms. Selwyn Mclaughlin, who has

## (undated) NOTE — LETTER
Date & Time: 7/31/2024, 7:00 PM  Patient: Basilio Patel  Encounter Provider(s):    Jyoti Esteves APRN       To Whom It May Concern:    Basilio Patel was seen and treated in our department on 7/31/2024. She should not return to work until 8/4/2024 . Must be fever free without medication use to return.    If you have any questions or concerns, please do not hesitate to call.        _____________________________  Physician/APC Signature

## (undated) NOTE — LETTER
12/31/21        [address] [id number]      Dear Андрей Martinez,    [facility] records indicate that you have outstanding lab work and or testing that was ordered for you and has not yet been completed:  PANCREATIC ELASTASE, FECAL (Or